# Patient Record
Sex: FEMALE | Race: WHITE | Employment: UNEMPLOYED | URBAN - METROPOLITAN AREA
[De-identification: names, ages, dates, MRNs, and addresses within clinical notes are randomized per-mention and may not be internally consistent; named-entity substitution may affect disease eponyms.]

---

## 2020-04-06 ENCOUNTER — HOSPITAL ENCOUNTER (OUTPATIENT)
Age: 80
Discharge: HOME | End: 2020-04-06
Payer: MEDICARE

## 2020-04-06 ENCOUNTER — HOSPITAL ENCOUNTER (OUTPATIENT)
Dept: CT IMAGING | Age: 80
Discharge: HOME | End: 2020-04-06
Payer: MEDICARE

## 2020-04-06 PROCEDURE — 70450 CT HEAD/BRAIN W/O DYE: CPT

## 2021-12-28 ENCOUNTER — HOSPITAL ENCOUNTER (EMERGENCY)
Age: 81
Discharge: HOME | End: 2021-12-28
Attending: EMERGENCY MEDICINE
Payer: MEDICARE

## 2021-12-28 ENCOUNTER — APPOINTMENT (OUTPATIENT)
Dept: GENERAL RADIOLOGY | Age: 81
End: 2021-12-28
Payer: MEDICARE

## 2021-12-28 VITALS
RESPIRATION RATE: 18 BRPM | TEMPERATURE: 97.7 F | BODY MASS INDEX: 23.44 KG/M2 | WEIGHT: 132.28 LBS | SYSTOLIC BLOOD PRESSURE: 160 MMHG | DIASTOLIC BLOOD PRESSURE: 94 MMHG | OXYGEN SATURATION: 96 % | HEIGHT: 63 IN | HEART RATE: 83 BPM

## 2021-12-28 DIAGNOSIS — M25.561 ACUTE PAIN OF RIGHT KNEE: Primary | ICD-10-CM

## 2021-12-28 PROCEDURE — 6370000000 HC RX 637 (ALT 250 FOR IP): Performed by: PHYSICIAN ASSISTANT

## 2021-12-28 PROCEDURE — 99284 EMERGENCY DEPT VISIT MOD MDM: CPT

## 2021-12-28 PROCEDURE — 73560 X-RAY EXAM OF KNEE 1 OR 2: CPT

## 2021-12-28 RX ORDER — LIDOCAINE 4 G/G
1 PATCH TOPICAL DAILY
Status: DISCONTINUED | OUTPATIENT
Start: 2021-12-28 | End: 2021-12-28 | Stop reason: HOSPADM

## 2021-12-28 RX ORDER — LIDOCAINE 50 MG/G
1 PATCH TOPICAL DAILY
Qty: 30 PATCH | Refills: 0 | Status: SHIPPED | OUTPATIENT
Start: 2021-12-28

## 2021-12-28 RX ORDER — ACETAMINOPHEN 500 MG
1000 TABLET ORAL ONCE
Status: COMPLETED | OUTPATIENT
Start: 2021-12-28 | End: 2021-12-28

## 2021-12-28 RX ADMIN — ACETAMINOPHEN 250 MG: 500 TABLET ORAL at 10:09

## 2021-12-28 ASSESSMENT — PAIN SCALES - GENERAL: PAINLEVEL_OUTOF10: 2

## 2021-12-28 ASSESSMENT — ENCOUNTER SYMPTOMS
ABDOMINAL PAIN: 0
SHORTNESS OF BREATH: 0

## 2021-12-28 NOTE — ED TRIAGE NOTES
R knee pain that started at 02 - 03 this am while pt was up using the bathroom. No pain while resting.  Able to lift at hip, pain while bending at knee, sensation intact

## 2021-12-28 NOTE — ED PROVIDER NOTES
ED Attending Attestation Note     Date of evaluation: 12/28/2021    This patient was seen by the advance practice provider. I have seen and examined the patient, agree with the workup, evaluation, management and diagnosis. The care plan has been discussed. My assessment reveals an 38-year-old female patient presents with right knee pain. She states that she woke up with her leg hanging over the bed. She is mildly tender diffusely around the knee without any obvious deformity. She is otherwise neurovascularly intact. .     Cristion Sr MD  12/28/21 1020

## 2021-12-28 NOTE — FLOWSHEET NOTE
12/28/21 1019   Encounter Summary   Services provided to: Patient and family together   Referral/Consult From: [address] [facility]   (12/28/21, JULIANO.)   Complexity of Encounter Moderate   Length of Encounter 15 minutes   Routine   Type Initial   Assessment Calm; Approachable   Intervention Nurtured hope   Outcome Expressed gratitude

## 2021-12-28 NOTE — ED PROVIDER NOTES
[facility] ENCOUNTER          PHYSICIAN ASSISTANT NOTE       Date of evaluation: 12/28/2021    Chief Complaint     Knee Pain (R knee, twisted at 03 this am )      History of Present Illness     Helga Castorena is a 78 y.o. female who presents to the emergency room with complaints of right knee pain. Patient states that she woke up in her sleep around 2 or 3 in the morning and noticed that both of her knees were bent. She states she stretched and had no pain to either knee. She got up to walk around and noticed some mild pain throughout her right knee. She was still able to ambulate, however was painful. She denies any radiation of pain. Denies numbness, tingling, weakness. Patient denies any recent falls or injury. She states she felt her normal self when going to bed last night. She has never had similar symptoms in the past.  She has not taken any medications in attempt to relieve her pain. She states her pain is a 0/10 at rest, however increases significantly with any ambulation. Review of Systems     Review of Systems   Constitutional: Negative for activity change, chills and fever. HENT: Negative for congestion. Respiratory: Negative for shortness of breath. Cardiovascular: Negative for chest pain. Gastrointestinal: Negative for abdominal pain. Genitourinary: Negative for dysuria. Musculoskeletal:        Right knee pain   Neurological: Negative for dizziness and headaches. Psychiatric/Behavioral: Negative for agitation. Past Medical, Surgical, Family, and Social History     She has a past medical history of Cancer ([facility] [address] Diverticulitis, and Unspecified cerebral artery occlusion with cerebral infarction. She has a past surgical history that includes Inguinal hernia repair (12/30/2011); joint replacement; and skin biopsy. Her family history is not on file. She reports that she has never smoked.  She has never used smokeless tobacco. She reports that she does not drink alcohol and does not use drugs. Medications     Previous Medications    ALENDRONATE (FOSAMAX) 70 MG TABLET    Take 70 mg by mouth every 7 days. ASPIRIN 81 MG EC TABLET    Take 81 mg by mouth daily. CALCIUM-MAGNESIUM-VITAMIN D (CITRACAL CALCIUM+D PO)    Take  by mouth. CLIDINIUM-CHLORDIAZEPOXIDE (LIBRAX) 2.5-5 MG PER CAPSULE    Take 1 capsule by mouth 4 times daily (before meals and nightly). LOVASTATIN (MEVACOR) 40 MG TABLET    Take 40 mg by mouth nightly. TRAZODONE (DESYREL) 50 MG TABLET    Take 75 mg by mouth nightly. Allergies     She has No Known Allergies. Physical Exam     INITIAL VITALS: BP: (!) 171/97, Temp: 97.7 °F (36.5 °C), Pulse: 97, Resp: 18, SpO2: 95 %  Physical Exam  Constitutional:       Appearance: Normal appearance. HENT:      Head: Normocephalic and atraumatic. Nose: Nose normal.   Eyes:      Pupils: Pupils are equal, round, and reactive to light. Cardiovascular:      Rate and Rhythm: Normal rate and regular rhythm. Pulmonary:      Effort: Pulmonary effort is normal.      Breath sounds: Normal breath sounds. Abdominal:      General: Abdomen is flat. Musculoskeletal:      Cervical back: Normal range of motion. Comments: Patient's right knee does appear mildly swollen compared with left. No erythema or warmth to touch. No effusion noted. Patient does have pain when performing passive range of motion. No bony tenderness palpation throughout right knee/tibia, fibula. No tenderness palpation of right calf. No unilateral calf swelling. Skin:     General: Skin is warm and dry. Neurological:      General: No focal deficit present. Mental Status: She is alert and oriented to person, place, and time. Sensory: No sensory deficit. Motor: No weakness.    Psychiatric:         Mood and Affect: Mood normal.         Behavior: Behavior normal.         Diagnostic Results     RADIOLOGY:  XR KNEE RIGHT (1-2 VIEWS) Final Result      2 views demonstrate no fracture or significant arthritis. There is lateral compartment chondrocalcinosis. No effusion. LABS:   No results found for this visit on 12/28/21. RECENT VITALS:  BP: (!) 160/94, Temp: 97.7 °F (36.5 °C), Pulse: 83, Resp: 18, SpO2: 96 %     ED Course     Nursing Notes, Past Medical Hx,Past Surgical Hx, Social Hx, Allergies, and Family Hx were reviewed. The patient was given the following medications:  Orders Placed This Encounter   Medications    acetaminophen (TYLENOL) tablet 1,000 mg    lidocaine 4 % external patch 1 patch    lidocaine (LIDODERM) 5 %     Sig: Place 1 patch onto the skin daily 12 hours on, 12 hours off. Dispense:  30 patch     Refill:  0       CONSULTS:  None    MEDICAL DECISION MAKING / ASSESSMENT / Angelique Titus is a 78 y.o. female who presents the emergency room right knee pain. Vital signs stable on presentation remained stable throughout her stay. Thorough history and physical exam performed in detail above. Patient presents the emergency department right knee pain as detailed in HPI. She has had no trauma or injury. She states when she woke up at 2 or 3 in the morning her knees were flexed. She stretched and had no pain. She got up to walk around and noticed pain to her right knee. She is still able to ambulate, however it is painful. Denies any pain at rest, only with ambulation. On physical exam her right knee does appear mildly swollen compared to the left. No erythema, warmth, or effusion. She does have pain with passive range of motion of right knee. No bony tenderness throughout right knee or proximal tibia/fibula. No soft tissue tenderness to surrounding musculature. No unilateral calf swelling. No ligamentous laxity on my exam.  Patient has 2+ dorsalis pedis pulse bilaterally. Sensation intact in all dermatomes of bilateral lower extremities.   I did order 1 g of Tylenol for her pain, however

## 2023-05-12 ENCOUNTER — APPOINTMENT (OUTPATIENT)
Dept: GENERAL RADIOLOGY | Age: 83
DRG: 481 | End: 2023-05-12
Payer: MEDICARE

## 2023-05-12 ENCOUNTER — APPOINTMENT (OUTPATIENT)
Dept: CT IMAGING | Age: 83
DRG: 481 | End: 2023-05-12
Payer: MEDICARE

## 2023-05-12 ENCOUNTER — ANESTHESIA (OUTPATIENT)
Dept: OPERATING ROOM | Age: 83
End: 2023-05-12
Payer: MEDICARE

## 2023-05-12 ENCOUNTER — HOSPITAL ENCOUNTER (INPATIENT)
Age: 83
LOS: 5 days | Discharge: NURSING, REHAB OR LONG-TERM CARE | DRG: 481 | End: 2023-05-17
Attending: STUDENT IN AN ORGANIZED HEALTH CARE EDUCATION/TRAINING PROGRAM | Admitting: SURGERY
Payer: MEDICARE

## 2023-05-12 ENCOUNTER — ANESTHESIA EVENT (OUTPATIENT)
Dept: OPERATING ROOM | Age: 83
End: 2023-05-12
Payer: MEDICARE

## 2023-05-12 DIAGNOSIS — S72.002A CLOSED DISPLACED FRACTURE OF LEFT FEMORAL NECK (HCC): Primary | ICD-10-CM

## 2023-05-12 DIAGNOSIS — S72.142D CLOSED DISPLACED INTERTROCHANTERIC FRACTURE OF LEFT FEMUR WITH ROUTINE HEALING, SUBSEQUENT ENCOUNTER: ICD-10-CM

## 2023-05-12 DIAGNOSIS — I48.91 ATRIAL FIBRILLATION WITH RVR (HCC): ICD-10-CM

## 2023-05-12 PROBLEM — S72.142A CLOSED DISPLACED INTERTROCHANTERIC FRACTURE OF LEFT FEMUR (HCC): Status: ACTIVE | Noted: 2023-05-12

## 2023-05-12 LAB
ABO + RH BLD: NORMAL
ANION GAP SERPL CALCULATED.3IONS-SCNC: 13 MMOL/L (ref 3–16)
ANTI-XA UNFRAC HEPARIN: >1.1 IU/ML (ref 0.3–0.7)
BASOPHILS # BLD: 0.1 K/UL (ref 0–0.2)
BASOPHILS NFR BLD: 0.6 %
BLD GP AB SCN SERPL QL: NORMAL
BUN SERPL-MCNC: 11 MG/DL (ref 7–20)
CALCIUM SERPL-MCNC: 9.5 MG/DL (ref 8.3–10.6)
CHLORIDE SERPL-SCNC: 105 MMOL/L (ref 99–110)
CO2 SERPL-SCNC: 26 MMOL/L (ref 21–32)
CREAT SERPL-MCNC: 1 MG/DL (ref 0.6–1.2)
DEPRECATED RDW RBC AUTO: 15.2 % (ref 12.4–15.4)
EKG ATRIAL RATE: 113 BPM
EKG DIAGNOSIS: NORMAL
EKG Q-T INTERVAL: 394 MS
EKG QRS DURATION: 124 MS
EKG QTC CALCULATION (BAZETT): 540 MS
EKG R AXIS: 18 DEGREES
EKG T AXIS: -12 DEGREES
EKG VENTRICULAR RATE: 113 BPM
EOSINOPHIL # BLD: 0.1 K/UL (ref 0–0.6)
EOSINOPHIL NFR BLD: 0.4 %
GFR SERPLBLD CREATININE-BSD FMLA CKD-EPI: 55 ML/MIN/{1.73_M2}
GLUCOSE SERPL-MCNC: 205 MG/DL (ref 70–99)
HCT VFR BLD AUTO: 44.3 % (ref 36–48)
HGB BLD-MCNC: 14.4 G/DL (ref 12–16)
INR PPP: 1.17 (ref 0.84–1.16)
LYMPHOCYTES # BLD: 0.9 K/UL (ref 1–5.1)
LYMPHOCYTES NFR BLD: 4 %
MCH RBC QN AUTO: 28.1 PG (ref 26–34)
MCHC RBC AUTO-ENTMCNC: 32.4 G/DL (ref 31–36)
MCV RBC AUTO: 86.7 FL (ref 80–100)
MONOCYTES # BLD: 1.5 K/UL (ref 0–1.3)
MONOCYTES NFR BLD: 6.9 %
NEUTROPHILS # BLD: 19.1 K/UL (ref 1.7–7.7)
NEUTROPHILS NFR BLD: 88.1 %
PLATELET # BLD AUTO: 691 K/UL (ref 135–450)
PMV BLD AUTO: 7 FL (ref 5–10.5)
POTASSIUM SERPL-SCNC: 3.6 MMOL/L (ref 3.5–5.1)
PROTHROMBIN TIME: 15 SEC (ref 11.5–14.8)
RBC # BLD AUTO: 5.11 M/UL (ref 4–5.2)
SODIUM SERPL-SCNC: 144 MMOL/L (ref 136–145)
TROPONIN, HIGH SENSITIVITY: 16 NG/L (ref 0–14)
TROPONIN, HIGH SENSITIVITY: 19 NG/L (ref 0–14)
WBC # BLD AUTO: 21.7 K/UL (ref 4–11)

## 2023-05-12 PROCEDURE — 3700000001 HC ADD 15 MINUTES (ANESTHESIA): Performed by: ORTHOPAEDIC SURGERY

## 2023-05-12 PROCEDURE — 73522 X-RAY EXAM HIPS BI 3-4 VIEWS: CPT

## 2023-05-12 PROCEDURE — 3600000004 HC SURGERY LEVEL 4 BASE: Performed by: ORTHOPAEDIC SURGERY

## 2023-05-12 PROCEDURE — 84484 ASSAY OF TROPONIN QUANT: CPT

## 2023-05-12 PROCEDURE — 1200000000 HC SEMI PRIVATE

## 2023-05-12 PROCEDURE — 72125 CT NECK SPINE W/O DYE: CPT

## 2023-05-12 PROCEDURE — 3700000000 HC ANESTHESIA ATTENDED CARE: Performed by: ORTHOPAEDIC SURGERY

## 2023-05-12 PROCEDURE — 80048 BASIC METABOLIC PNL TOTAL CA: CPT

## 2023-05-12 PROCEDURE — 2500000003 HC RX 250 WO HCPCS: Performed by: NURSE ANESTHETIST, CERTIFIED REGISTERED

## 2023-05-12 PROCEDURE — 6370000000 HC RX 637 (ALT 250 FOR IP): Performed by: NURSE PRACTITIONER

## 2023-05-12 PROCEDURE — 2720000010 HC SURG SUPPLY STERILE: Performed by: ORTHOPAEDIC SURGERY

## 2023-05-12 PROCEDURE — 85025 COMPLETE CBC W/AUTO DIFF WBC: CPT

## 2023-05-12 PROCEDURE — 36415 COLL VENOUS BLD VENIPUNCTURE: CPT

## 2023-05-12 PROCEDURE — 3209999900 FLUORO FOR SURGICAL PROCEDURES

## 2023-05-12 PROCEDURE — 7100000000 HC PACU RECOVERY - FIRST 15 MIN: Performed by: ORTHOPAEDIC SURGERY

## 2023-05-12 PROCEDURE — 93005 ELECTROCARDIOGRAM TRACING: CPT | Performed by: STUDENT IN AN ORGANIZED HEALTH CARE EDUCATION/TRAINING PROGRAM

## 2023-05-12 PROCEDURE — 85610 PROTHROMBIN TIME: CPT

## 2023-05-12 PROCEDURE — 70450 CT HEAD/BRAIN W/O DYE: CPT

## 2023-05-12 PROCEDURE — 6360000002 HC RX W HCPCS: Performed by: NURSE ANESTHETIST, CERTIFIED REGISTERED

## 2023-05-12 PROCEDURE — 99285 EMERGENCY DEPT VISIT HI MDM: CPT

## 2023-05-12 PROCEDURE — 86900 BLOOD TYPING SEROLOGIC ABO: CPT

## 2023-05-12 PROCEDURE — 0QS706Z REPOSITION LEFT UPPER FEMUR WITH INTRAMEDULLARY INTERNAL FIXATION DEVICE, OPEN APPROACH: ICD-10-PCS | Performed by: ORTHOPAEDIC SURGERY

## 2023-05-12 PROCEDURE — 2580000003 HC RX 258: Performed by: STUDENT IN AN ORGANIZED HEALTH CARE EDUCATION/TRAINING PROGRAM

## 2023-05-12 PROCEDURE — 86901 BLOOD TYPING SEROLOGIC RH(D): CPT

## 2023-05-12 PROCEDURE — 96374 THER/PROPH/DIAG INJ IV PUSH: CPT

## 2023-05-12 PROCEDURE — 99024 POSTOP FOLLOW-UP VISIT: CPT | Performed by: PHYSICIAN ASSISTANT

## 2023-05-12 PROCEDURE — 86850 RBC ANTIBODY SCREEN: CPT

## 2023-05-12 PROCEDURE — C1769 GUIDE WIRE: HCPCS | Performed by: ORTHOPAEDIC SURGERY

## 2023-05-12 PROCEDURE — 6360000002 HC RX W HCPCS: Performed by: STUDENT IN AN ORGANIZED HEALTH CARE EDUCATION/TRAINING PROGRAM

## 2023-05-12 PROCEDURE — 7100000001 HC PACU RECOVERY - ADDTL 15 MIN: Performed by: ORTHOPAEDIC SURGERY

## 2023-05-12 PROCEDURE — 27245 TREAT THIGH FRACTURE: CPT | Performed by: ORTHOPAEDIC SURGERY

## 2023-05-12 PROCEDURE — 2580000003 HC RX 258: Performed by: NURSE ANESTHETIST, CERTIFIED REGISTERED

## 2023-05-12 PROCEDURE — 99223 1ST HOSP IP/OBS HIGH 75: CPT | Performed by: ORTHOPAEDIC SURGERY

## 2023-05-12 PROCEDURE — 85520 HEPARIN ASSAY: CPT

## 2023-05-12 PROCEDURE — 96361 HYDRATE IV INFUSION ADD-ON: CPT

## 2023-05-12 PROCEDURE — 71045 X-RAY EXAM CHEST 1 VIEW: CPT

## 2023-05-12 PROCEDURE — C1713 ANCHOR/SCREW BN/BN,TIS/BN: HCPCS | Performed by: ORTHOPAEDIC SURGERY

## 2023-05-12 PROCEDURE — 2709999900 HC NON-CHARGEABLE SUPPLY: Performed by: ORTHOPAEDIC SURGERY

## 2023-05-12 PROCEDURE — 73552 X-RAY EXAM OF FEMUR 2/>: CPT

## 2023-05-12 PROCEDURE — 2580000003 HC RX 258: Performed by: SURGERY

## 2023-05-12 PROCEDURE — 2580000003 HC RX 258: Performed by: ORTHOPAEDIC SURGERY

## 2023-05-12 PROCEDURE — 3600000014 HC SURGERY LEVEL 4 ADDTL 15MIN: Performed by: ORTHOPAEDIC SURGERY

## 2023-05-12 DEVICE — LAG SCREW, TI
Type: IMPLANTABLE DEVICE | Site: HIP | Status: FUNCTIONAL
Brand: GAMMA

## 2023-05-12 DEVICE — K-WIRE
Type: IMPLANTABLE DEVICE | Site: HIP | Status: FUNCTIONAL
Brand: GAMMA

## 2023-05-12 DEVICE — LOCKING SCREW
Type: IMPLANTABLE DEVICE | Site: HIP | Status: FUNCTIONAL
Brand: T2 ALPHA

## 2023-05-12 DEVICE — LONG NAIL KIT R1.5, TI, LEFT
Type: IMPLANTABLE DEVICE | Site: HIP | Status: FUNCTIONAL
Brand: GAMMA

## 2023-05-12 RX ORDER — ONDANSETRON 2 MG/ML
4 INJECTION INTRAMUSCULAR; INTRAVENOUS
Status: DISCONTINUED | OUTPATIENT
Start: 2023-05-12 | End: 2023-05-12 | Stop reason: HOSPADM

## 2023-05-12 RX ORDER — SODIUM CHLORIDE 450 MG/100ML
INJECTION, SOLUTION INTRAVENOUS CONTINUOUS
Status: DISCONTINUED | OUTPATIENT
Start: 2023-05-12 | End: 2023-05-14

## 2023-05-12 RX ORDER — SODIUM CHLORIDE 0.9 % (FLUSH) 0.9 %
5-40 SYRINGE (ML) INJECTION EVERY 12 HOURS SCHEDULED
Status: DISCONTINUED | OUTPATIENT
Start: 2023-05-12 | End: 2023-05-12 | Stop reason: HOSPADM

## 2023-05-12 RX ORDER — CEFAZOLIN SODIUM 1 G/3ML
INJECTION, POWDER, FOR SOLUTION INTRAMUSCULAR; INTRAVENOUS PRN
Status: DISCONTINUED | OUTPATIENT
Start: 2023-05-12 | End: 2023-05-12 | Stop reason: SDUPTHER

## 2023-05-12 RX ORDER — ACETAMINOPHEN 325 MG/1
650 TABLET ORAL EVERY 6 HOURS PRN
Status: DISCONTINUED | OUTPATIENT
Start: 2023-05-12 | End: 2023-05-17 | Stop reason: HOSPADM

## 2023-05-12 RX ORDER — ETOMIDATE 2 MG/ML
INJECTION INTRAVENOUS PRN
Status: DISCONTINUED | OUTPATIENT
Start: 2023-05-12 | End: 2023-05-12 | Stop reason: SDUPTHER

## 2023-05-12 RX ORDER — SODIUM CHLORIDE 9 MG/ML
INJECTION, SOLUTION INTRAVENOUS PRN
Status: DISCONTINUED | OUTPATIENT
Start: 2023-05-12 | End: 2023-05-12 | Stop reason: HOSPADM

## 2023-05-12 RX ORDER — FENTANYL CITRATE 50 UG/ML
INJECTION, SOLUTION INTRAMUSCULAR; INTRAVENOUS PRN
Status: DISCONTINUED | OUTPATIENT
Start: 2023-05-12 | End: 2023-05-12 | Stop reason: SDUPTHER

## 2023-05-12 RX ORDER — POLYETHYLENE GLYCOL 3350 17 G/17G
17 POWDER, FOR SOLUTION ORAL DAILY PRN
Status: DISCONTINUED | OUTPATIENT
Start: 2023-05-12 | End: 2023-05-17 | Stop reason: HOSPADM

## 2023-05-12 RX ORDER — ONDANSETRON 2 MG/ML
INJECTION INTRAMUSCULAR; INTRAVENOUS PRN
Status: DISCONTINUED | OUTPATIENT
Start: 2023-05-12 | End: 2023-05-12 | Stop reason: SDUPTHER

## 2023-05-12 RX ORDER — LIDOCAINE HYDROCHLORIDE 20 MG/ML
INJECTION, SOLUTION EPIDURAL; INFILTRATION; INTRACAUDAL; PERINEURAL PRN
Status: DISCONTINUED | OUTPATIENT
Start: 2023-05-12 | End: 2023-05-12 | Stop reason: SDUPTHER

## 2023-05-12 RX ORDER — ATORVASTATIN CALCIUM 20 MG/1
10 TABLET, FILM COATED ORAL DAILY
Status: DISCONTINUED | OUTPATIENT
Start: 2023-05-12 | End: 2023-05-13

## 2023-05-12 RX ORDER — HYDROCODONE BITARTRATE AND ACETAMINOPHEN 5; 325 MG/1; MG/1
1 TABLET ORAL EVERY 6 HOURS PRN
Status: DISCONTINUED | OUTPATIENT
Start: 2023-05-12 | End: 2023-05-17 | Stop reason: HOSPADM

## 2023-05-12 RX ORDER — SUCCINYLCHOLINE CHLORIDE 20 MG/ML
INJECTION INTRAMUSCULAR; INTRAVENOUS PRN
Status: DISCONTINUED | OUTPATIENT
Start: 2023-05-12 | End: 2023-05-12 | Stop reason: SDUPTHER

## 2023-05-12 RX ORDER — ACETAMINOPHEN 650 MG/1
650 SUPPOSITORY RECTAL EVERY 6 HOURS PRN
Status: DISCONTINUED | OUTPATIENT
Start: 2023-05-12 | End: 2023-05-17 | Stop reason: HOSPADM

## 2023-05-12 RX ORDER — MORPHINE SULFATE 2 MG/ML
2 INJECTION, SOLUTION INTRAMUSCULAR; INTRAVENOUS
Status: DISCONTINUED | OUTPATIENT
Start: 2023-05-12 | End: 2023-05-16

## 2023-05-12 RX ORDER — SODIUM CHLORIDE 9 MG/ML
INJECTION, SOLUTION INTRAVENOUS PRN
Status: DISCONTINUED | OUTPATIENT
Start: 2023-05-12 | End: 2023-05-17 | Stop reason: HOSPADM

## 2023-05-12 RX ORDER — CITALOPRAM 10 MG/1
10 TABLET ORAL DAILY
COMMUNITY
Start: 2023-03-07

## 2023-05-12 RX ORDER — ROCURONIUM BROMIDE 10 MG/ML
INJECTION, SOLUTION INTRAVENOUS PRN
Status: DISCONTINUED | OUTPATIENT
Start: 2023-05-12 | End: 2023-05-12 | Stop reason: SDUPTHER

## 2023-05-12 RX ORDER — SODIUM CHLORIDE 0.9 % (FLUSH) 0.9 %
5-40 SYRINGE (ML) INJECTION EVERY 12 HOURS SCHEDULED
Status: DISCONTINUED | OUTPATIENT
Start: 2023-05-12 | End: 2023-05-17 | Stop reason: HOSPADM

## 2023-05-12 RX ORDER — IPRATROPIUM BROMIDE AND ALBUTEROL SULFATE 2.5; .5 MG/3ML; MG/3ML
1 SOLUTION RESPIRATORY (INHALATION)
Status: DISCONTINUED | OUTPATIENT
Start: 2023-05-12 | End: 2023-05-12 | Stop reason: HOSPADM

## 2023-05-12 RX ORDER — SODIUM CHLORIDE, SODIUM LACTATE, POTASSIUM CHLORIDE, AND CALCIUM CHLORIDE .6; .31; .03; .02 G/100ML; G/100ML; G/100ML; G/100ML
500 INJECTION, SOLUTION INTRAVENOUS ONCE
Status: COMPLETED | OUTPATIENT
Start: 2023-05-12 | End: 2023-05-12

## 2023-05-12 RX ORDER — SODIUM CHLORIDE 0.9 % (FLUSH) 0.9 %
5-40 SYRINGE (ML) INJECTION PRN
Status: DISCONTINUED | OUTPATIENT
Start: 2023-05-12 | End: 2023-05-12 | Stop reason: HOSPADM

## 2023-05-12 RX ORDER — ACETAMINOPHEN 325 MG/1
650 TABLET ORAL
Status: DISCONTINUED | OUTPATIENT
Start: 2023-05-12 | End: 2023-05-12 | Stop reason: HOSPADM

## 2023-05-12 RX ORDER — ONDANSETRON 4 MG/1
4 TABLET, ORALLY DISINTEGRATING ORAL EVERY 8 HOURS PRN
Status: DISCONTINUED | OUTPATIENT
Start: 2023-05-12 | End: 2023-05-17 | Stop reason: HOSPADM

## 2023-05-12 RX ORDER — SODIUM CHLORIDE 9 MG/ML
INJECTION, SOLUTION INTRAVENOUS CONTINUOUS
Status: ACTIVE | OUTPATIENT
Start: 2023-05-12 | End: 2023-05-13

## 2023-05-12 RX ORDER — SODIUM CHLORIDE 0.9 % (FLUSH) 0.9 %
5-40 SYRINGE (ML) INJECTION PRN
Status: DISCONTINUED | OUTPATIENT
Start: 2023-05-12 | End: 2023-05-17 | Stop reason: HOSPADM

## 2023-05-12 RX ORDER — ONDANSETRON 2 MG/ML
4 INJECTION INTRAMUSCULAR; INTRAVENOUS EVERY 6 HOURS PRN
Status: DISCONTINUED | OUTPATIENT
Start: 2023-05-12 | End: 2023-05-17 | Stop reason: HOSPADM

## 2023-05-12 RX ORDER — PROCHLORPERAZINE EDISYLATE 5 MG/ML
5 INJECTION INTRAMUSCULAR; INTRAVENOUS
Status: DISCONTINUED | OUTPATIENT
Start: 2023-05-12 | End: 2023-05-12 | Stop reason: HOSPADM

## 2023-05-12 RX ORDER — CITALOPRAM 10 MG/1
10 TABLET ORAL DAILY
Status: DISCONTINUED | OUTPATIENT
Start: 2023-05-12 | End: 2023-05-17 | Stop reason: HOSPADM

## 2023-05-12 RX ORDER — APIXABAN 2.5 MG/1
2.5 TABLET, FILM COATED ORAL 2 TIMES DAILY
COMMUNITY
Start: 2023-03-30

## 2023-05-12 RX ORDER — CHLORDIAZEPOXIDE HYDROCHLORIDE AND CLIDINIUM BROMIDE 5; 2.5 MG/1; MG/1
1 CAPSULE ORAL
Status: DISCONTINUED | OUTPATIENT
Start: 2023-05-12 | End: 2023-05-13 | Stop reason: ALTCHOICE

## 2023-05-12 RX ORDER — FENTANYL CITRATE 50 UG/ML
25 INJECTION, SOLUTION INTRAMUSCULAR; INTRAVENOUS EVERY 5 MIN PRN
Status: DISCONTINUED | OUTPATIENT
Start: 2023-05-12 | End: 2023-05-12 | Stop reason: HOSPADM

## 2023-05-12 RX ORDER — SODIUM CHLORIDE, SODIUM LACTATE, POTASSIUM CHLORIDE, CALCIUM CHLORIDE 600; 310; 30; 20 MG/100ML; MG/100ML; MG/100ML; MG/100ML
INJECTION, SOLUTION INTRAVENOUS CONTINUOUS PRN
Status: DISCONTINUED | OUTPATIENT
Start: 2023-05-12 | End: 2023-05-12 | Stop reason: SDUPTHER

## 2023-05-12 RX ORDER — LABETALOL HYDROCHLORIDE 5 MG/ML
10 INJECTION, SOLUTION INTRAVENOUS
Status: DISCONTINUED | OUTPATIENT
Start: 2023-05-12 | End: 2023-05-12 | Stop reason: HOSPADM

## 2023-05-12 RX ORDER — MORPHINE SULFATE 2 MG/ML
4 INJECTION, SOLUTION INTRAMUSCULAR; INTRAVENOUS
Status: DISCONTINUED | OUTPATIENT
Start: 2023-05-12 | End: 2023-05-16

## 2023-05-12 RX ADMIN — SODIUM CHLORIDE, SODIUM LACTATE, POTASSIUM CHLORIDE, AND CALCIUM CHLORIDE: .6; .31; .03; .02 INJECTION, SOLUTION INTRAVENOUS at 16:42

## 2023-05-12 RX ADMIN — SUGAMMADEX 200 MG: 100 INJECTION, SOLUTION INTRAVENOUS at 17:26

## 2023-05-12 RX ADMIN — CEFAZOLIN SODIUM 2 G: 1 POWDER, FOR SOLUTION INTRAMUSCULAR; INTRAVENOUS at 16:27

## 2023-05-12 RX ADMIN — ROCURONIUM BROMIDE 30 MG: 10 INJECTION INTRAVENOUS at 16:41

## 2023-05-12 RX ADMIN — LIDOCAINE HYDROCHLORIDE 100 MG: 20 INJECTION, SOLUTION EPIDURAL; INFILTRATION; INTRACAUDAL; PERINEURAL at 16:30

## 2023-05-12 RX ADMIN — ETOMIDATE 10 MG: 20 INJECTION, SOLUTION INTRAVENOUS at 16:32

## 2023-05-12 RX ADMIN — HYDROMORPHONE HYDROCHLORIDE 0.25 MG: 1 INJECTION, SOLUTION INTRAMUSCULAR; INTRAVENOUS; SUBCUTANEOUS at 09:56

## 2023-05-12 RX ADMIN — SODIUM CHLORIDE: 4.5 INJECTION, SOLUTION INTRAVENOUS at 21:50

## 2023-05-12 RX ADMIN — SODIUM CHLORIDE, SODIUM LACTATE, POTASSIUM CHLORIDE, AND CALCIUM CHLORIDE: .6; .31; .03; .02 INJECTION, SOLUTION INTRAVENOUS at 16:25

## 2023-05-12 RX ADMIN — PHENYLEPHRINE HYDROCHLORIDE 100 MCG: 10 INJECTION, SOLUTION INTRAMUSCULAR; INTRAVENOUS; SUBCUTANEOUS at 16:32

## 2023-05-12 RX ADMIN — PHENYLEPHRINE HYDROCHLORIDE 200 MCG: 10 INJECTION, SOLUTION INTRAMUSCULAR; INTRAVENOUS; SUBCUTANEOUS at 16:47

## 2023-05-12 RX ADMIN — SODIUM CHLORIDE: 9 INJECTION, SOLUTION INTRAVENOUS at 18:41

## 2023-05-12 RX ADMIN — FENTANYL CITRATE 100 MCG: 50 INJECTION, SOLUTION INTRAMUSCULAR; INTRAVENOUS at 16:30

## 2023-05-12 RX ADMIN — SODIUM CHLORIDE, POTASSIUM CHLORIDE, SODIUM LACTATE AND CALCIUM CHLORIDE 500 ML: 600; 310; 30; 20 INJECTION, SOLUTION INTRAVENOUS at 11:03

## 2023-05-12 RX ADMIN — ONDANSETRON 4 MG: 2 INJECTION INTRAMUSCULAR; INTRAVENOUS at 16:41

## 2023-05-12 RX ADMIN — HYDROCODONE BITARTRATE AND ACETAMINOPHEN 1 TABLET: 5; 325 TABLET ORAL at 21:47

## 2023-05-12 RX ADMIN — SUCCINYLCHOLINE CHLORIDE 80 MG: 20 INJECTION, SOLUTION INTRAMUSCULAR; INTRAVENOUS; PARENTERAL at 16:33

## 2023-05-12 ASSESSMENT — PAIN SCALES - GENERAL
PAINLEVEL_OUTOF10: 0
PAINLEVEL_OUTOF10: 0
PAINLEVEL_OUTOF10: 10
PAINLEVEL_OUTOF10: 9
PAINLEVEL_OUTOF10: 8

## 2023-05-12 ASSESSMENT — PAIN DESCRIPTION - ORIENTATION
ORIENTATION: LEFT
ORIENTATION: LEFT

## 2023-05-12 ASSESSMENT — PAIN - FUNCTIONAL ASSESSMENT
PAIN_FUNCTIONAL_ASSESSMENT: PREVENTS OR INTERFERES WITH ALL ACTIVE AND SOME PASSIVE ACTIVITIES
PAIN_FUNCTIONAL_ASSESSMENT: 0-10

## 2023-05-12 ASSESSMENT — ENCOUNTER SYMPTOMS
RHINORRHEA: 0
DIARRHEA: 0
NAUSEA: 0
SHORTNESS OF BREATH: 0
ABDOMINAL PAIN: 0
VOMITING: 0
BACK PAIN: 0
COUGH: 0

## 2023-05-12 ASSESSMENT — PAIN DESCRIPTION - DESCRIPTORS
DESCRIPTORS: ACHING
DESCRIPTORS: ACHING

## 2023-05-12 ASSESSMENT — PAIN DESCRIPTION - LOCATION
LOCATION: LEG
LOCATION: LEG

## 2023-05-12 ASSESSMENT — PAIN DESCRIPTION - PAIN TYPE: TYPE: ACUTE PAIN

## 2023-05-12 ASSESSMENT — PAIN DESCRIPTION - FREQUENCY: FREQUENCY: CONTINUOUS

## 2023-05-12 NOTE — ANESTHESIA POSTPROCEDURE EVALUATION
Department of Anesthesiology  Postprocedure Note    Patient: Logan Coats  MRN: [id number]  YOB: 1940  Date of evaluation: 5/12/2023      Procedure Summary     Date: 05/12/23 Room / Location: [address]    Anesthesia Start: 1625 Anesthesia Stop: 8323    Procedure: LEFT HIP GAMMA NAILING (Left: Hip) Diagnosis:       Type I or II open fracture of left hip, initial encounter ([facility]      (LEFT HIP FRACTURE)    Surgeons: Alisha Florian MD Responsible Provider: Jacqui Worrell MD    Anesthesia Type: general ASA Status: 3          Anesthesia Type: No value filed.     Ivan Phase I: Ivan Score: 9    Ivan Phase II:        Anesthesia Post Evaluation    Patient location during evaluation: PACU  Patient participation: complete - patient participated  Level of consciousness: awake  Pain score: 0  Nausea & Vomiting: no nausea and no vomiting  Complications: no  Cardiovascular status: blood pressure returned to baseline  Respiratory status: acceptable  Hydration status: euvolemic

## 2023-05-12 NOTE — ANESTHESIA PRE PROCEDURE
Department of Anesthesiology  Preprocedure Note       Name:  Radha Mayer   Age:  78 y.o.  :  1940                                          MRN:  [id number]         Date:  2023      Surgeon: Warden Black):  Sebastian Vazquez MD    Procedure: Procedure(s):  LEFT HIP GAMMA NAILING    Medications prior to admission:   Prior to Admission medications    Medication Sig Start Date End Date Taking? Authorizing Provider   ELIQUIS 2.5 MG TABS tablet Take 1 tablet by mouth 2 times daily 3/30/23   Historical Provider, MD   citalopram (CELEXA) 10 MG tablet TAKE 1 TABLET BY MOUTH EVERY DAY STRENGTH: 10 MG 3/7/23   Historical Provider, MD   clidinium-chlordiazepoxide (LIBRAX) 2.5-5 MG per capsule Take 1 capsule by mouth 4 times daily (before meals and nightly). Historical Provider, MD   lovastatin (MEVACOR) 40 MG tablet Take 40 mg by mouth nightly. Historical Provider, MD   Calcium-Magnesium-Vitamin D (CITRACAL CALCIUM+D PO) Take  by mouth. Historical Provider, MD       Current medications:    No current facility-administered medications for this encounter. Current Outpatient Medications   Medication Sig Dispense Refill    ELIQUIS 2.5 MG TABS tablet Take 1 tablet by mouth 2 times daily      citalopram (CELEXA) 10 MG tablet TAKE 1 TABLET BY MOUTH EVERY DAY STRENGTH: 10 MG      clidinium-chlordiazepoxide (LIBRAX) 2.5-5 MG per capsule Take 1 capsule by mouth 4 times daily (before meals and nightly).  lovastatin (MEVACOR) 40 MG tablet Take 40 mg by mouth nightly.  Calcium-Magnesium-Vitamin D (CITRACAL CALCIUM+D PO) Take  by mouth.            Allergies:  No Known Allergies    Problem List:    Patient Active Problem List   Diagnosis Code    Incarcerated femoral hernia K41.30       Past Medical History:        Diagnosis Date    Cancer ([facility] [address]     Diverticulitis     Unspecified cerebral artery occlusion with cerebral infarction        Past Surgical History:        Procedure Laterality Date   

## 2023-05-13 LAB
HCT VFR BLD AUTO: 34.3 % (ref 36–48)
HGB BLD-MCNC: 11.3 G/DL (ref 12–16)

## 2023-05-13 PROCEDURE — 1200000000 HC SEMI PRIVATE

## 2023-05-13 PROCEDURE — 85014 HEMATOCRIT: CPT

## 2023-05-13 PROCEDURE — 97535 SELF CARE MNGMENT TRAINING: CPT

## 2023-05-13 PROCEDURE — 36415 COLL VENOUS BLD VENIPUNCTURE: CPT

## 2023-05-13 PROCEDURE — 97116 GAIT TRAINING THERAPY: CPT

## 2023-05-13 PROCEDURE — 2580000003 HC RX 258: Performed by: ORTHOPAEDIC SURGERY

## 2023-05-13 PROCEDURE — 85018 HEMOGLOBIN: CPT

## 2023-05-13 PROCEDURE — 94150 VITAL CAPACITY TEST: CPT

## 2023-05-13 PROCEDURE — 97166 OT EVAL MOD COMPLEX 45 MIN: CPT

## 2023-05-13 PROCEDURE — 97530 THERAPEUTIC ACTIVITIES: CPT

## 2023-05-13 PROCEDURE — 6370000000 HC RX 637 (ALT 250 FOR IP): Performed by: NURSE PRACTITIONER

## 2023-05-13 PROCEDURE — 97162 PT EVAL MOD COMPLEX 30 MIN: CPT

## 2023-05-13 PROCEDURE — 6360000002 HC RX W HCPCS: Performed by: ORTHOPAEDIC SURGERY

## 2023-05-13 PROCEDURE — 6370000000 HC RX 637 (ALT 250 FOR IP): Performed by: ORTHOPAEDIC SURGERY

## 2023-05-13 RX ORDER — ATORVASTATIN CALCIUM 80 MG/1
80 TABLET, FILM COATED ORAL DAILY
Status: DISCONTINUED | OUTPATIENT
Start: 2023-05-14 | End: 2023-05-17 | Stop reason: HOSPADM

## 2023-05-13 RX ADMIN — SODIUM CHLORIDE, PRESERVATIVE FREE 10 ML: 5 INJECTION INTRAVENOUS at 08:08

## 2023-05-13 RX ADMIN — CITALOPRAM HYDROBROMIDE 10 MG: 10 TABLET ORAL at 08:07

## 2023-05-13 RX ADMIN — SODIUM CHLORIDE: 4.5 INJECTION, SOLUTION INTRAVENOUS at 13:06

## 2023-05-13 RX ADMIN — HYDROCODONE BITARTRATE AND ACETAMINOPHEN 1 TABLET: 5; 325 TABLET ORAL at 08:07

## 2023-05-13 RX ADMIN — CEFAZOLIN 2000 MG: 2 INJECTION, POWDER, FOR SOLUTION INTRAMUSCULAR; INTRAVENOUS at 08:06

## 2023-05-13 RX ADMIN — HYDROCODONE BITARTRATE AND ACETAMINOPHEN 1 TABLET: 5; 325 TABLET ORAL at 18:54

## 2023-05-13 RX ADMIN — CEFAZOLIN 2000 MG: 2 INJECTION, POWDER, FOR SOLUTION INTRAMUSCULAR; INTRAVENOUS at 00:52

## 2023-05-13 ASSESSMENT — PAIN DESCRIPTION - PAIN TYPE: TYPE: SURGICAL PAIN

## 2023-05-13 ASSESSMENT — PAIN DESCRIPTION - DESCRIPTORS
DESCRIPTORS: ACHING
DESCRIPTORS: SORE;ACHING
DESCRIPTORS: ACHING

## 2023-05-13 ASSESSMENT — PAIN DESCRIPTION - ORIENTATION
ORIENTATION: LEFT

## 2023-05-13 ASSESSMENT — PAIN DESCRIPTION - LOCATION
LOCATION: HIP
LOCATION: LEG
LOCATION: HIP
LOCATION: HIP

## 2023-05-13 ASSESSMENT — PAIN SCALES - GENERAL
PAINLEVEL_OUTOF10: 4
PAINLEVEL_OUTOF10: 5
PAINLEVEL_OUTOF10: 4
PAINLEVEL_OUTOF10: 6
PAINLEVEL_OUTOF10: 8
PAINLEVEL_OUTOF10: 4

## 2023-05-13 ASSESSMENT — PAIN - FUNCTIONAL ASSESSMENT: PAIN_FUNCTIONAL_ASSESSMENT: ACTIVITIES ARE NOT PREVENTED

## 2023-05-13 ASSESSMENT — PAIN DESCRIPTION - FREQUENCY: FREQUENCY: INTERMITTENT

## 2023-05-14 LAB
ALBUMIN SERPL-MCNC: 2.7 G/DL (ref 3.4–5)
ALBUMIN/GLOB SERPL: 1.1 {RATIO} (ref 1.1–2.2)
ALP SERPL-CCNC: 53 U/L (ref 40–129)
ALT SERPL-CCNC: <5 U/L (ref 10–40)
ANION GAP SERPL CALCULATED.3IONS-SCNC: 5 MMOL/L (ref 3–16)
AST SERPL-CCNC: 16 U/L (ref 15–37)
BASOPHILS # BLD: 0.1 K/UL (ref 0–0.2)
BASOPHILS NFR BLD: 0.8 %
BILIRUB SERPL-MCNC: 0.8 MG/DL (ref 0–1)
BUN SERPL-MCNC: 17 MG/DL (ref 7–20)
CALCIUM SERPL-MCNC: 8 MG/DL (ref 8.3–10.6)
CHLORIDE SERPL-SCNC: 104 MMOL/L (ref 99–110)
CO2 SERPL-SCNC: 28 MMOL/L (ref 21–32)
CREAT SERPL-MCNC: 0.8 MG/DL (ref 0.6–1.2)
DEPRECATED RDW RBC AUTO: 14.9 % (ref 12.4–15.4)
EOSINOPHIL # BLD: 0.2 K/UL (ref 0–0.6)
EOSINOPHIL NFR BLD: 2.4 %
GFR SERPLBLD CREATININE-BSD FMLA CKD-EPI: >60 ML/MIN/{1.73_M2}
GLUCOSE SERPL-MCNC: 128 MG/DL (ref 70–99)
HCT VFR BLD AUTO: 25.7 % (ref 36–48)
HGB BLD-MCNC: 8.4 G/DL (ref 12–16)
LYMPHOCYTES # BLD: 1.7 K/UL (ref 1–5.1)
LYMPHOCYTES NFR BLD: 19.6 %
MCH RBC QN AUTO: 28.9 PG (ref 26–34)
MCHC RBC AUTO-ENTMCNC: 32.8 G/DL (ref 31–36)
MCV RBC AUTO: 88.1 FL (ref 80–100)
MONOCYTES # BLD: 1.5 K/UL (ref 0–1.3)
MONOCYTES NFR BLD: 16.9 %
NEUTROPHILS # BLD: 5.2 K/UL (ref 1.7–7.7)
NEUTROPHILS NFR BLD: 60.3 %
PLATELET # BLD AUTO: 458 K/UL (ref 135–450)
PMV BLD AUTO: 6.7 FL (ref 5–10.5)
POTASSIUM SERPL-SCNC: 4.5 MMOL/L (ref 3.5–5.1)
PROT SERPL-MCNC: 5.1 G/DL (ref 6.4–8.2)
RBC # BLD AUTO: 2.92 M/UL (ref 4–5.2)
SODIUM SERPL-SCNC: 137 MMOL/L (ref 136–145)
WBC # BLD AUTO: 8.7 K/UL (ref 4–11)

## 2023-05-14 PROCEDURE — 85025 COMPLETE CBC W/AUTO DIFF WBC: CPT

## 2023-05-14 PROCEDURE — 6370000000 HC RX 637 (ALT 250 FOR IP): Performed by: INTERNAL MEDICINE

## 2023-05-14 PROCEDURE — 6360000002 HC RX W HCPCS: Performed by: ORTHOPAEDIC SURGERY

## 2023-05-14 PROCEDURE — 80053 COMPREHEN METABOLIC PANEL: CPT

## 2023-05-14 PROCEDURE — 36415 COLL VENOUS BLD VENIPUNCTURE: CPT

## 2023-05-14 PROCEDURE — 1200000000 HC SEMI PRIVATE

## 2023-05-14 PROCEDURE — 2580000003 HC RX 258: Performed by: ORTHOPAEDIC SURGERY

## 2023-05-14 PROCEDURE — 6370000000 HC RX 637 (ALT 250 FOR IP): Performed by: ORTHOPAEDIC SURGERY

## 2023-05-14 PROCEDURE — 6370000000 HC RX 637 (ALT 250 FOR IP): Performed by: NURSE PRACTITIONER

## 2023-05-14 RX ORDER — ATORVASTATIN CALCIUM 80 MG/1
80 TABLET, FILM COATED ORAL DAILY
COMMUNITY

## 2023-05-14 RX ORDER — MIRTAZAPINE 15 MG/1
7.5 TABLET, FILM COATED ORAL NIGHTLY
COMMUNITY

## 2023-05-14 RX ORDER — CLOPIDOGREL BISULFATE 75 MG/1
75 TABLET ORAL DAILY
COMMUNITY

## 2023-05-14 RX ADMIN — ACETAMINOPHEN 650 MG: 325 TABLET ORAL at 08:50

## 2023-05-14 RX ADMIN — ATORVASTATIN CALCIUM 80 MG: 80 TABLET, FILM COATED ORAL at 08:50

## 2023-05-14 RX ADMIN — SODIUM CHLORIDE, PRESERVATIVE FREE 10 ML: 5 INJECTION INTRAVENOUS at 08:58

## 2023-05-14 RX ADMIN — SODIUM CHLORIDE: 4.5 INJECTION, SOLUTION INTRAVENOUS at 00:20

## 2023-05-14 RX ADMIN — HYDROCODONE BITARTRATE AND ACETAMINOPHEN 1 TABLET: 5; 325 TABLET ORAL at 13:57

## 2023-05-14 RX ADMIN — SODIUM CHLORIDE, PRESERVATIVE FREE 10 ML: 5 INJECTION INTRAVENOUS at 20:20

## 2023-05-14 RX ADMIN — MORPHINE SULFATE 2 MG: 2 INJECTION, SOLUTION INTRAMUSCULAR; INTRAVENOUS at 00:19

## 2023-05-14 RX ADMIN — CITALOPRAM HYDROBROMIDE 10 MG: 10 TABLET ORAL at 08:57

## 2023-05-14 RX ADMIN — MORPHINE SULFATE 2 MG: 2 INJECTION, SOLUTION INTRAMUSCULAR; INTRAVENOUS at 20:21

## 2023-05-14 ASSESSMENT — PAIN DESCRIPTION - LOCATION
LOCATION: ABDOMEN;HIP
LOCATION: ABDOMEN;HIP
LOCATION: HIP
LOCATION: HIP

## 2023-05-14 ASSESSMENT — PAIN - FUNCTIONAL ASSESSMENT
PAIN_FUNCTIONAL_ASSESSMENT: ACTIVITIES ARE NOT PREVENTED

## 2023-05-14 ASSESSMENT — PAIN SCALES - GENERAL
PAINLEVEL_OUTOF10: 4
PAINLEVEL_OUTOF10: 8
PAINLEVEL_OUTOF10: 3
PAINLEVEL_OUTOF10: 4
PAINLEVEL_OUTOF10: 3
PAINLEVEL_OUTOF10: 0
PAINLEVEL_OUTOF10: 0
PAINLEVEL_OUTOF10: 5
PAINLEVEL_OUTOF10: 0
PAINLEVEL_OUTOF10: 7

## 2023-05-14 ASSESSMENT — PAIN DESCRIPTION - ORIENTATION
ORIENTATION: LEFT

## 2023-05-14 ASSESSMENT — PAIN DESCRIPTION - DESCRIPTORS
DESCRIPTORS: ACHING;DISCOMFORT
DESCRIPTORS: ACHING
DESCRIPTORS: ACHING
DESCRIPTORS: ACHING;DISCOMFORT

## 2023-05-14 ASSESSMENT — PAIN DESCRIPTION - PAIN TYPE
TYPE: SURGICAL PAIN
TYPE: SURGICAL PAIN

## 2023-05-15 LAB
ANION GAP SERPL CALCULATED.3IONS-SCNC: 5 MMOL/L (ref 3–16)
BASOPHILS # BLD: 0.1 K/UL (ref 0–0.2)
BASOPHILS NFR BLD: 0.8 %
BUN SERPL-MCNC: 13 MG/DL (ref 7–20)
CALCIUM SERPL-MCNC: 8.4 MG/DL (ref 8.3–10.6)
CHLORIDE SERPL-SCNC: 102 MMOL/L (ref 99–110)
CO2 SERPL-SCNC: 31 MMOL/L (ref 21–32)
CREAT SERPL-MCNC: 0.7 MG/DL (ref 0.6–1.2)
DEPRECATED RDW RBC AUTO: 14.5 % (ref 12.4–15.4)
EOSINOPHIL # BLD: 0.3 K/UL (ref 0–0.6)
EOSINOPHIL NFR BLD: 3.7 %
GFR SERPLBLD CREATININE-BSD FMLA CKD-EPI: >60 ML/MIN/{1.73_M2}
GLUCOSE SERPL-MCNC: 135 MG/DL (ref 70–99)
HCT VFR BLD AUTO: 26.1 % (ref 36–48)
HGB BLD-MCNC: 8.6 G/DL (ref 12–16)
LYMPHOCYTES # BLD: 1.8 K/UL (ref 1–5.1)
LYMPHOCYTES NFR BLD: 19.6 %
MCH RBC QN AUTO: 29.4 PG (ref 26–34)
MCHC RBC AUTO-ENTMCNC: 33.1 G/DL (ref 31–36)
MCV RBC AUTO: 88.8 FL (ref 80–100)
MONOCYTES # BLD: 1 K/UL (ref 0–1.3)
MONOCYTES NFR BLD: 10.9 %
NEUTROPHILS # BLD: 6 K/UL (ref 1.7–7.7)
NEUTROPHILS NFR BLD: 65 %
PLATELET # BLD AUTO: 527 K/UL (ref 135–450)
PMV BLD AUTO: 6.9 FL (ref 5–10.5)
POTASSIUM SERPL-SCNC: 3.7 MMOL/L (ref 3.5–5.1)
RBC # BLD AUTO: 2.94 M/UL (ref 4–5.2)
SODIUM SERPL-SCNC: 138 MMOL/L (ref 136–145)
WBC # BLD AUTO: 9.2 K/UL (ref 4–11)

## 2023-05-15 PROCEDURE — 2580000003 HC RX 258: Performed by: ORTHOPAEDIC SURGERY

## 2023-05-15 PROCEDURE — 85025 COMPLETE CBC W/AUTO DIFF WBC: CPT

## 2023-05-15 PROCEDURE — 6370000000 HC RX 637 (ALT 250 FOR IP): Performed by: NURSE PRACTITIONER

## 2023-05-15 PROCEDURE — 80048 BASIC METABOLIC PNL TOTAL CA: CPT

## 2023-05-15 PROCEDURE — 6370000000 HC RX 637 (ALT 250 FOR IP): Performed by: INTERNAL MEDICINE

## 2023-05-15 PROCEDURE — 99024 POSTOP FOLLOW-UP VISIT: CPT | Performed by: PHYSICIAN ASSISTANT

## 2023-05-15 PROCEDURE — 6360000002 HC RX W HCPCS: Performed by: ORTHOPAEDIC SURGERY

## 2023-05-15 PROCEDURE — 6370000000 HC RX 637 (ALT 250 FOR IP): Performed by: ORTHOPAEDIC SURGERY

## 2023-05-15 PROCEDURE — 1200000000 HC SEMI PRIVATE

## 2023-05-15 PROCEDURE — 36415 COLL VENOUS BLD VENIPUNCTURE: CPT

## 2023-05-15 PROCEDURE — APPNB45 APP NON BILLABLE 31-45 MINUTES: Performed by: PHYSICIAN ASSISTANT

## 2023-05-15 RX ORDER — HYDROCODONE BITARTRATE AND ACETAMINOPHEN 5; 325 MG/1; MG/1
1 TABLET ORAL EVERY 6 HOURS PRN
Qty: 12 TABLET | Refills: 0 | Status: SHIPPED | OUTPATIENT
Start: 2023-05-15 | End: 2023-05-20

## 2023-05-15 RX ORDER — OLANZAPINE 5 MG/1
5 TABLET ORAL NIGHTLY
Status: DISCONTINUED | OUTPATIENT
Start: 2023-05-15 | End: 2023-05-17 | Stop reason: HOSPADM

## 2023-05-15 RX ADMIN — APIXABAN 2.5 MG: 2.5 TABLET, FILM COATED ORAL at 20:20

## 2023-05-15 RX ADMIN — SODIUM CHLORIDE, PRESERVATIVE FREE 10 ML: 5 INJECTION INTRAVENOUS at 08:28

## 2023-05-15 RX ADMIN — SODIUM CHLORIDE, PRESERVATIVE FREE 10 ML: 5 INJECTION INTRAVENOUS at 20:21

## 2023-05-15 RX ADMIN — HYDROCODONE BITARTRATE AND ACETAMINOPHEN 1 TABLET: 5; 325 TABLET ORAL at 08:27

## 2023-05-15 RX ADMIN — CITALOPRAM HYDROBROMIDE 10 MG: 10 TABLET ORAL at 08:27

## 2023-05-15 RX ADMIN — OLANZAPINE 5 MG: 5 TABLET, FILM COATED ORAL at 21:42

## 2023-05-15 RX ADMIN — MORPHINE SULFATE 4 MG: 2 INJECTION, SOLUTION INTRAMUSCULAR; INTRAVENOUS at 20:21

## 2023-05-15 RX ADMIN — APIXABAN 2.5 MG: 2.5 TABLET, FILM COATED ORAL at 11:12

## 2023-05-15 RX ADMIN — HYDROCODONE BITARTRATE AND ACETAMINOPHEN 1 TABLET: 5; 325 TABLET ORAL at 23:54

## 2023-05-15 RX ADMIN — ATORVASTATIN CALCIUM 80 MG: 80 TABLET, FILM COATED ORAL at 08:27

## 2023-05-15 RX ADMIN — MORPHINE SULFATE 4 MG: 2 INJECTION, SOLUTION INTRAMUSCULAR; INTRAVENOUS at 01:31

## 2023-05-15 ASSESSMENT — PAIN DESCRIPTION - LOCATION
LOCATION: HIP
LOCATION: LEG
LOCATION: HIP
LOCATION: HIP

## 2023-05-15 ASSESSMENT — PAIN DESCRIPTION - FREQUENCY: FREQUENCY: INTERMITTENT

## 2023-05-15 ASSESSMENT — PAIN DESCRIPTION - DESCRIPTORS
DESCRIPTORS: ACHING;DISCOMFORT
DESCRIPTORS: ACHING;DISCOMFORT
DESCRIPTORS: ACHING
DESCRIPTORS: ACHING;SORE

## 2023-05-15 ASSESSMENT — PAIN SCALES - GENERAL
PAINLEVEL_OUTOF10: 8
PAINLEVEL_OUTOF10: 0
PAINLEVEL_OUTOF10: 4
PAINLEVEL_OUTOF10: 8
PAINLEVEL_OUTOF10: 3
PAINLEVEL_OUTOF10: 4
PAINLEVEL_OUTOF10: 10
PAINLEVEL_OUTOF10: 4

## 2023-05-15 ASSESSMENT — PAIN DESCRIPTION - ORIENTATION
ORIENTATION: LEFT

## 2023-05-15 ASSESSMENT — PAIN - FUNCTIONAL ASSESSMENT
PAIN_FUNCTIONAL_ASSESSMENT: ACTIVITIES ARE NOT PREVENTED
PAIN_FUNCTIONAL_ASSESSMENT: PREVENTS OR INTERFERES SOME ACTIVE ACTIVITIES AND ADLS

## 2023-05-15 ASSESSMENT — PAIN DESCRIPTION - ONSET: ONSET: GRADUAL

## 2023-05-15 ASSESSMENT — PAIN DESCRIPTION - PAIN TYPE: TYPE: SURGICAL PAIN;ACUTE PAIN

## 2023-05-15 NOTE — PLAN OF CARE
Problem: Skin/Tissue Integrity  Goal: Absence of new skin breakdown  Description: 1. Monitor for areas of redness and/or skin breakdown  2. Assess vascular access sites hourly  3. Every 4-6 hours minimum:  Change oxygen saturation probe site  4. Every 4-6 hours:  If on nasal continuous positive airway pressure, respiratory therapy assess nares and determine need for appliance change or resting period.   5/15/2023 0739 by Will Pacheco RN  Outcome: Progressing  Note: Preventive measures in place, left heel soft boggy but blanchable, elevated off bed with pillows      Problem: Safety - Adult  Goal: Free from fall injury  5/15/2023 0739 by Will Pacheco RN  Outcome: Progressing  Flowsheets (Taken 5/15/2023 0739)  Free From Fall Injury: Instruct family/caregiver on patient safety  Note: Pt calls out before moving or getting out of bed, knows she needs assistance      Problem: Discharge Planning  Goal: Discharge to home or other facility with appropriate resources  5/15/2023 0739 by Will Pacheco RN  Outcome: Progressing  Flowsheets (Taken 5/15/2023 0739)  Discharge to home or other facility with appropriate resources:   Identify barriers to discharge with patient and caregiver   Arrange for needed discharge resources and transportation as appropriate  Note: SNF placement

## 2023-05-15 NOTE — CARE COORDINATION
5:00 PM  CM spoke with patient's daughter Shaunna Serrano over the phone. She would like referrals to: [facility], [facility] [address], [address] of [place] and [facility]. Case Management Assessment  Initial Evaluation    Date/Time of Evaluation: 5/15/2023 12:23 PM  Assessment Completed by: Nayan Cerna RN    If patient is discharged prior to next notation, then this note serves as note for discharge by case management. Patient Name: Tonny Taylor                   YOB: 1940  Diagnosis: Atrial fibrillation with RVR ([facility] [address] [I48.91]  Hip fracture requiring operative repair, left, closed, initial encounter [facility]) Jean Claude Dubose  Closed displaced fracture of left femoral neck ([facility] [address] Jean Claude Ask                   Date / Time: 5/12/2023  8:37 AM    Patient Admission Status: Inpatient   Readmission Risk (Low < 19, Mod (19-27), High > 27): Readmission Risk Score: 13.5    Current PCP: Radha Lira  PCP verified by CM? No    Chart Reviewed: Yes      History Provided by: Child/Family  Patient Orientation: Alert and Oriented    Patient Cognition: Alert    Hospitalization in the last 30 days (Readmission):  No    If yes, Readmission Assessment in CM Navigator will be completed. Advance Directives:      Code Status: Full Code   Patient's Primary Decision Maker is:        Discharge Planning:    Patient lives with: Alone Type of Home: House  Primary Care Giver: Self  Patient Support Systems include: Children   Current Financial resources: Medicare  Current community resources: None  Current services prior to admission: None            Current DME:              Type of Home Care services:  None    ADLS  Prior functional level: Independent in ADLs/IADLs  Current functional level: Assistance with the following:, Bathing, Dressing, Mobility    PT AM-PAC: 7 /24  OT AM-PAC: 14 /24    Family can provide assistance at DC:  Yes  Would you like Case Management to discuss the discharge plan with

## 2023-05-15 NOTE — DISCHARGE INSTR - COC
Continuity of Care Form    Patient Name: Glory Chen   :  1940  MRN:  [id number]    Admit date:  2023  Discharge date:  ***    Code Status Order: Full Code   Advance Directives:     Admitting Physician:  Candance Griffon, MD  PCP: Eri Parekh    Discharging Nurse: Susanne Dooley 23 Unit/Room#: 1678/4822-57  Discharging Unit Phone Number: ***    Emergency Contact:   Extended Emergency Contact Information  Primary Emergency Contact: [address] Phone: [phone]  Mobile Phone: [phone]  Relation: Child  Secondary Emergency Contact: [address] [facility] Phone: [phone]  Relation: Child    Past Surgical History:  Past Surgical History:   Procedure Laterality Date    HIP SURGERY Left 2023    LEFT HIP GAMMA NAILING performed by Leatha Cervantes MD at [address] [place] [address] Drive  2011    HERNIA INGUINAL REPAIR LEFT WITH POSSIBLE SMALL BOWEL    JOINT REPLACEMENT      SKIN BIOPSY         Immunization History:   Immunization History   Administered Date(s) Administered    COVID-19, MODERNA Bivalent, (age 12y+), IM, 48 mcg/0.5 mL 2022    COVID-19, MODERNA Booster BLUE border, (age 18y+), IM, 50mcg/0.25mL 2022    COVID-19, PFIZER PURPLE top, DILUTE for use, (age 15 y+), 30mcg/0.3mL 2021, 2021, 2021       Active Problems:  Patient Active Problem List   Diagnosis Code    Incarcerated femoral hernia K41.30    Closed displaced intertrochanteric fracture of left femur ([facility] [address] S72.142A       Isolation/Infection:   Isolation            No Isolation          Patient Infection Status       None to display            Nurse Assessment:  Last Vital Signs: /64   Pulse (!) 105   Temp 98.2 °F (36.8 °C) (Oral)   Resp 16   Ht 5' 3\" (1.6 m)   Wt 128 lb 4.8 oz (58.2 kg)   SpO2 94%   BMI 22.73 kg/m²     Last documented pain score (0-10 scale): Pain Level: 3  Last Weight:   Wt Readings from Last 1 Encounters:

## 2023-05-16 LAB
ANION GAP SERPL CALCULATED.3IONS-SCNC: 11 MMOL/L (ref 3–16)
BASOPHILS # BLD: 0.1 K/UL (ref 0–0.2)
BASOPHILS NFR BLD: 0.6 %
BUN SERPL-MCNC: 11 MG/DL (ref 7–20)
CALCIUM SERPL-MCNC: 8.6 MG/DL (ref 8.3–10.6)
CHLORIDE SERPL-SCNC: 103 MMOL/L (ref 99–110)
CO2 SERPL-SCNC: 28 MMOL/L (ref 21–32)
CREAT SERPL-MCNC: 0.6 MG/DL (ref 0.6–1.2)
DEPRECATED RDW RBC AUTO: 14.5 % (ref 12.4–15.4)
EOSINOPHIL # BLD: 0 K/UL (ref 0–0.6)
EOSINOPHIL NFR BLD: 0.3 %
GFR SERPLBLD CREATININE-BSD FMLA CKD-EPI: >60 ML/MIN/{1.73_M2}
GLUCOSE SERPL-MCNC: 164 MG/DL (ref 70–99)
HCT VFR BLD AUTO: 26.9 % (ref 36–48)
HGB BLD-MCNC: 8.8 G/DL (ref 12–16)
LYMPHOCYTES # BLD: 1.1 K/UL (ref 1–5.1)
LYMPHOCYTES NFR BLD: 8.5 %
MCH RBC QN AUTO: 29.4 PG (ref 26–34)
MCHC RBC AUTO-ENTMCNC: 32.7 G/DL (ref 31–36)
MCV RBC AUTO: 90 FL (ref 80–100)
MONOCYTES # BLD: 1.4 K/UL (ref 0–1.3)
MONOCYTES NFR BLD: 10.9 %
NEUTROPHILS # BLD: 10.3 K/UL (ref 1.7–7.7)
NEUTROPHILS NFR BLD: 79.7 %
PLATELET # BLD AUTO: 540 K/UL (ref 135–450)
PMV BLD AUTO: 6.8 FL (ref 5–10.5)
POTASSIUM SERPL-SCNC: 4.2 MMOL/L (ref 3.5–5.1)
RBC # BLD AUTO: 2.99 M/UL (ref 4–5.2)
SODIUM SERPL-SCNC: 142 MMOL/L (ref 136–145)
WBC # BLD AUTO: 12.9 K/UL (ref 4–11)

## 2023-05-16 PROCEDURE — APPNB45 APP NON BILLABLE 31-45 MINUTES: Performed by: PHYSICIAN ASSISTANT

## 2023-05-16 PROCEDURE — 6370000000 HC RX 637 (ALT 250 FOR IP): Performed by: STUDENT IN AN ORGANIZED HEALTH CARE EDUCATION/TRAINING PROGRAM

## 2023-05-16 PROCEDURE — 6370000000 HC RX 637 (ALT 250 FOR IP): Performed by: ORTHOPAEDIC SURGERY

## 2023-05-16 PROCEDURE — 6370000000 HC RX 637 (ALT 250 FOR IP): Performed by: NURSE PRACTITIONER

## 2023-05-16 PROCEDURE — 36415 COLL VENOUS BLD VENIPUNCTURE: CPT

## 2023-05-16 PROCEDURE — 99024 POSTOP FOLLOW-UP VISIT: CPT | Performed by: PHYSICIAN ASSISTANT

## 2023-05-16 PROCEDURE — 85025 COMPLETE CBC W/AUTO DIFF WBC: CPT

## 2023-05-16 PROCEDURE — 6370000000 HC RX 637 (ALT 250 FOR IP): Performed by: INTERNAL MEDICINE

## 2023-05-16 PROCEDURE — 6360000002 HC RX W HCPCS: Performed by: INTERNAL MEDICINE

## 2023-05-16 PROCEDURE — 80048 BASIC METABOLIC PNL TOTAL CA: CPT

## 2023-05-16 PROCEDURE — 97530 THERAPEUTIC ACTIVITIES: CPT

## 2023-05-16 PROCEDURE — 2580000003 HC RX 258: Performed by: ORTHOPAEDIC SURGERY

## 2023-05-16 PROCEDURE — 1200000000 HC SEMI PRIVATE

## 2023-05-16 PROCEDURE — 6360000002 HC RX W HCPCS: Performed by: ORTHOPAEDIC SURGERY

## 2023-05-16 RX ORDER — HALOPERIDOL 5 MG/ML
1 INJECTION INTRAMUSCULAR ONCE
Status: DISCONTINUED | OUTPATIENT
Start: 2023-05-16 | End: 2023-05-16 | Stop reason: ALTCHOICE

## 2023-05-16 RX ORDER — MECOBALAMIN 5000 MCG
5 TABLET,DISINTEGRATING ORAL NIGHTLY
Status: DISCONTINUED | OUTPATIENT
Start: 2023-05-16 | End: 2023-05-17 | Stop reason: HOSPADM

## 2023-05-16 RX ADMIN — HYDROCODONE BITARTRATE AND ACETAMINOPHEN 1 TABLET: 5; 325 TABLET ORAL at 14:37

## 2023-05-16 RX ADMIN — APIXABAN 2.5 MG: 2.5 TABLET, FILM COATED ORAL at 21:18

## 2023-05-16 RX ADMIN — HYDROMORPHONE HYDROCHLORIDE 0.5 MG: 1 INJECTION, SOLUTION INTRAMUSCULAR; INTRAVENOUS; SUBCUTANEOUS at 06:18

## 2023-05-16 RX ADMIN — SODIUM CHLORIDE, PRESERVATIVE FREE 10 ML: 5 INJECTION INTRAVENOUS at 21:17

## 2023-05-16 RX ADMIN — SODIUM CHLORIDE, PRESERVATIVE FREE 10 ML: 5 INJECTION INTRAVENOUS at 21:18

## 2023-05-16 RX ADMIN — APIXABAN 2.5 MG: 2.5 TABLET, FILM COATED ORAL at 08:37

## 2023-05-16 RX ADMIN — SODIUM CHLORIDE, PRESERVATIVE FREE 10 ML: 5 INJECTION INTRAVENOUS at 08:46

## 2023-05-16 RX ADMIN — SODIUM CHLORIDE, PRESERVATIVE FREE 10 ML: 5 INJECTION INTRAVENOUS at 08:47

## 2023-05-16 RX ADMIN — CITALOPRAM HYDROBROMIDE 10 MG: 10 TABLET ORAL at 08:37

## 2023-05-16 RX ADMIN — MORPHINE SULFATE 4 MG: 2 INJECTION, SOLUTION INTRAMUSCULAR; INTRAVENOUS at 02:02

## 2023-05-16 RX ADMIN — HYDROCODONE BITARTRATE AND ACETAMINOPHEN 1 TABLET: 5; 325 TABLET ORAL at 08:37

## 2023-05-16 RX ADMIN — ATORVASTATIN CALCIUM 80 MG: 80 TABLET, FILM COATED ORAL at 08:37

## 2023-05-16 ASSESSMENT — PAIN SCALES - PAIN ASSESSMENT IN ADVANCED DEMENTIA (PAINAD)
NEGVOCALIZATION: 2
FACIALEXPRESSION: 2
NEGVOCALIZATION: 1
BREATHING: 0
BODYLANGUAGE: 2
BREATHING: 0
TOTALSCORE: 2
BREATHING: 0
TOTALSCORE: 8
CONSOLABILITY: 2
CONSOLABILITY: 2
BODYLANGUAGE: 2
NEGVOCALIZATION: 1
TOTALSCORE: 7
BODYLANGUAGE: 1
FACIALEXPRESSION: 0
CONSOLABILITY: 0
FACIALEXPRESSION: 2

## 2023-05-16 ASSESSMENT — PAIN - FUNCTIONAL ASSESSMENT
PAIN_FUNCTIONAL_ASSESSMENT: PREVENTS OR INTERFERES WITH MANY ACTIVE NOT PASSIVE ACTIVITIES
PAIN_FUNCTIONAL_ASSESSMENT: ACTIVITIES ARE NOT PREVENTED

## 2023-05-16 ASSESSMENT — PAIN DESCRIPTION - FREQUENCY: FREQUENCY: CONTINUOUS

## 2023-05-16 ASSESSMENT — PAIN SCALES - GENERAL
PAINLEVEL_OUTOF10: 9
PAINLEVEL_OUTOF10: 4
PAINLEVEL_OUTOF10: 3
PAINLEVEL_OUTOF10: 10
PAINLEVEL_OUTOF10: 3

## 2023-05-16 ASSESSMENT — PAIN DESCRIPTION - DESCRIPTORS
DESCRIPTORS: ACHING;SORE
DESCRIPTORS: ACHING

## 2023-05-16 ASSESSMENT — PAIN DESCRIPTION - ORIENTATION
ORIENTATION: LEFT
ORIENTATION: LEFT

## 2023-05-16 ASSESSMENT — PAIN DESCRIPTION - LOCATION
LOCATION: HIP
LOCATION: HIP

## 2023-05-16 ASSESSMENT — PAIN DESCRIPTION - PAIN TYPE: TYPE: SURGICAL PAIN

## 2023-05-16 ASSESSMENT — PAIN DESCRIPTION - ONSET: ONSET: ON-GOING

## 2023-05-16 NOTE — PLAN OF CARE
Problem: Safety - Adult  Goal: Free from fall injury  Outcome: Progressing  Flowsheets (Taken 5/16/2023 1621)  Free From Fall Injury: Instruct family/caregiver on patient safety  Note: All fall precautions in place. Bed locked and in lowest position with alarm on. Overbed table and personal belonings within reach. Call light within reach and patient instructed to use call light for assistance. Non-skid socks on. Problem: Discharge Planning  Goal: Discharge to home or other facility with appropriate resources  Outcome: Progressing  Flowsheets (Taken 5/16/2023 1621)  Discharge to home or other facility with appropriate resources:   Identify barriers to discharge with patient and caregiver   Arrange for needed discharge resources and transportation as appropriate  Note: Discharge pending placement at facility. Case management following plan of care. Problem: Pain  Goal: Verbalizes/displays adequate comfort level or baseline comfort level  Outcome: Progressing  Flowsheets (Taken 5/16/2023 1621)  Verbalizes/displays adequate comfort level or baseline comfort level:   Encourage patient to monitor pain and request assistance   Assess pain using appropriate pain scale  Note: Patient is sleeping, no complaints of pain at this time.

## 2023-05-16 NOTE — PLAN OF CARE
Problem: Skin/Tissue Integrity  Goal: Absence of new skin breakdown  Description: 1. Monitor for areas of redness and/or skin breakdown  2. Assess vascular access sites hourly  3. Every 4-6 hours minimum:  Change oxygen saturation probe site  4. Every 4-6 hours:  If on nasal continuous positive airway pressure, respiratory therapy assess nares and determine need for appliance change or resting period.   Outcome: Progressing     Problem: Safety - Adult  Goal: Free from fall injury  Outcome: Progressing  Flowsheets (Taken 5/15/2023 0746 by Alex Matthews RN)  Free From Fall Injury: Instruct family/caregiver on patient safety     Problem: Discharge Planning  Goal: Discharge to home or other facility with appropriate resources  Outcome: Progressing  Flowsheets (Taken 5/15/2023 4697)  Discharge to home or other facility with appropriate resources:   Identify barriers to discharge with patient and caregiver   Arrange for needed discharge resources and transportation as appropriate   Identify discharge learning needs (meds, wound care, etc)   Refer to discharge planning if patient needs post-hospital services based on physician order or complex needs related to functional status, cognitive ability or social support system   Arrange for interpreters to assist at discharge as needed     Problem: Chronic Conditions and Co-morbidities  Goal: Patient's chronic conditions and co-morbidity symptoms are monitored and maintained or improved  Outcome: Progressing  Flowsheets (Taken 5/13/2023 2104)  Care Plan - Patient's Chronic Conditions and Co-Morbidity Symptoms are Monitored and Maintained or Improved:   Monitor and assess patient's chronic conditions and comorbid symptoms for stability, deterioration, or improvement   Collaborate with multidisciplinary team to address chronic and comorbid conditions and prevent exacerbation or deterioration   Update acute care plan with appropriate goals if chronic or comorbid symptoms

## 2023-05-17 VITALS
HEART RATE: 94 BPM | HEIGHT: 63 IN | SYSTOLIC BLOOD PRESSURE: 116 MMHG | WEIGHT: 128.3 LBS | DIASTOLIC BLOOD PRESSURE: 75 MMHG | TEMPERATURE: 98.1 F | RESPIRATION RATE: 16 BRPM | OXYGEN SATURATION: 92 % | BODY MASS INDEX: 22.73 KG/M2

## 2023-05-17 LAB
ANION GAP SERPL CALCULATED.3IONS-SCNC: 4 MMOL/L (ref 3–16)
BASOPHILS # BLD: 0.1 K/UL (ref 0–0.2)
BASOPHILS NFR BLD: 0.8 %
BUN SERPL-MCNC: 13 MG/DL (ref 7–20)
CALCIUM SERPL-MCNC: 8.5 MG/DL (ref 8.3–10.6)
CHLORIDE SERPL-SCNC: 103 MMOL/L (ref 99–110)
CO2 SERPL-SCNC: 33 MMOL/L (ref 21–32)
CREAT SERPL-MCNC: 0.6 MG/DL (ref 0.6–1.2)
DEPRECATED RDW RBC AUTO: 14.6 % (ref 12.4–15.4)
EOSINOPHIL # BLD: 0.5 K/UL (ref 0–0.6)
EOSINOPHIL NFR BLD: 4.8 %
GFR SERPLBLD CREATININE-BSD FMLA CKD-EPI: >60 ML/MIN/{1.73_M2}
GLUCOSE SERPL-MCNC: 129 MG/DL (ref 70–99)
HCT VFR BLD AUTO: 27 % (ref 36–48)
HGB BLD-MCNC: 8.9 G/DL (ref 12–16)
LYMPHOCYTES # BLD: 1.8 K/UL (ref 1–5.1)
LYMPHOCYTES NFR BLD: 18.3 %
MCH RBC QN AUTO: 28.9 PG (ref 26–34)
MCHC RBC AUTO-ENTMCNC: 32.9 G/DL (ref 31–36)
MCV RBC AUTO: 88.1 FL (ref 80–100)
MONOCYTES # BLD: 1.1 K/UL (ref 0–1.3)
MONOCYTES NFR BLD: 11.2 %
NEUTROPHILS # BLD: 6.5 K/UL (ref 1.7–7.7)
NEUTROPHILS NFR BLD: 64.9 %
PLATELET # BLD AUTO: 578 K/UL (ref 135–450)
PMV BLD AUTO: 6.6 FL (ref 5–10.5)
POTASSIUM SERPL-SCNC: 4.3 MMOL/L (ref 3.5–5.1)
RBC # BLD AUTO: 3.06 M/UL (ref 4–5.2)
SODIUM SERPL-SCNC: 140 MMOL/L (ref 136–145)
WBC # BLD AUTO: 10.1 K/UL (ref 4–11)

## 2023-05-17 PROCEDURE — 80048 BASIC METABOLIC PNL TOTAL CA: CPT

## 2023-05-17 PROCEDURE — 36415 COLL VENOUS BLD VENIPUNCTURE: CPT

## 2023-05-17 PROCEDURE — 6370000000 HC RX 637 (ALT 250 FOR IP): Performed by: INTERNAL MEDICINE

## 2023-05-17 PROCEDURE — 85025 COMPLETE CBC W/AUTO DIFF WBC: CPT

## 2023-05-17 PROCEDURE — 6370000000 HC RX 637 (ALT 250 FOR IP): Performed by: ORTHOPAEDIC SURGERY

## 2023-05-17 PROCEDURE — APPNB45 APP NON BILLABLE 31-45 MINUTES: Performed by: PHYSICIAN ASSISTANT

## 2023-05-17 PROCEDURE — 6370000000 HC RX 637 (ALT 250 FOR IP): Performed by: NURSE PRACTITIONER

## 2023-05-17 PROCEDURE — 2580000003 HC RX 258: Performed by: ORTHOPAEDIC SURGERY

## 2023-05-17 RX ADMIN — SODIUM CHLORIDE, PRESERVATIVE FREE 10 ML: 5 INJECTION INTRAVENOUS at 08:47

## 2023-05-17 RX ADMIN — APIXABAN 2.5 MG: 2.5 TABLET, FILM COATED ORAL at 08:46

## 2023-05-17 RX ADMIN — CITALOPRAM HYDROBROMIDE 10 MG: 10 TABLET ORAL at 08:46

## 2023-05-17 RX ADMIN — HYDROCODONE BITARTRATE AND ACETAMINOPHEN 1 TABLET: 5; 325 TABLET ORAL at 16:43

## 2023-05-17 RX ADMIN — SODIUM CHLORIDE, PRESERVATIVE FREE 20 ML: 5 INJECTION INTRAVENOUS at 08:46

## 2023-05-17 RX ADMIN — ATORVASTATIN CALCIUM 80 MG: 80 TABLET, FILM COATED ORAL at 08:46

## 2023-05-17 ASSESSMENT — PAIN SCALES - GENERAL: PAINLEVEL_OUTOF10: 5

## 2023-05-17 NOTE — PLAN OF CARE
Problem: Safety - Adult  Goal: Free from fall injury  5/17/2023 0905 by Roz Dan RN  Outcome: Progressing  -fall precautions in place, bed in lowest position, call light within reach     Problem: Pain  Goal: Verbalizes/displays adequate comfort level or baseline comfort level  5/17/2023 0905 by Roz Dan RN  Outcome: Progressing   -pt denying pain, displaying adequate level of comfort    Problem: ABCDS Injury Assessment  Goal: Absence of physical injury  5/17/2023 0905 by Roz Dan RN  Outcome: Progressing  -safety precautions implemented to decrease risk of injury

## 2023-05-17 NOTE — PROGRESS NOTES
[address] [facility] Orthopedic Surgery  Progress Note        POD # 3, s/p Gamma nail left hip. Pt comfortable, no c/o. Drsg left hip D/C/I,  Mild pain with left hip ROM, NVI    CBC:   Lab Results   Component Value Date/Time    WBC 8.7 05/14/2023 10:09 AM    RBC 2.92 05/14/2023 10:09 AM    HGB 8.4 05/14/2023 10:09 AM    HCT 25.7 05/14/2023 10:09 AM    MCV 88.1 05/14/2023 10:09 AM    MCH 28.9 05/14/2023 10:09 AM    MCHC 32.8 05/14/2023 10:09 AM    RDW 14.9 05/14/2023 10:09 AM     05/14/2023 10:09 AM    MPV 6.7 05/14/2023 10:09 AM     PT/INR:    Lab Results   Component Value Date/Time    PROTIME 15.0 05/12/2023 09:46 AM    INR 1.17 05/12/2023 09:46 AM     PTT:  No results found for: APTT[APTT    A/P: s/p Gamma nail left hip  - Stable  - PT/OT, WBAT  - Ok to D/C to ECF from ortho standpoint.  - F/U Dr Ramiro Contreras in 2 weeks.       HOSEA James 5/15/2023 10:58 AM
[facility] Orthopedic Surgery  Progress Note        POD # 5, s/p Gamma nail left hip. Pt comfortable, no c/o. Drsg left hip D/C/I,  Mild pain with left hip ROM, NVI    CBC:   Lab Results   Component Value Date/Time    WBC 10.1 05/17/2023 06:02 AM    RBC 3.06 05/17/2023 06:02 AM    HGB 8.9 05/17/2023 06:02 AM    HCT 27.0 05/17/2023 06:02 AM    MCV 88.1 05/17/2023 06:02 AM    MCH 28.9 05/17/2023 06:02 AM    MCHC 32.9 05/17/2023 06:02 AM    RDW 14.6 05/17/2023 06:02 AM     05/17/2023 06:02 AM    MPV 6.6 05/17/2023 06:02 AM     PT/INR:    Lab Results   Component Value Date/Time    PROTIME 15.0 05/12/2023 09:46 AM    INR 1.17 05/12/2023 09:46 AM     PTT:  No results found for: APTT[APTT    A/P: s/p Gamma nail left hip  - Stable  - PT/OT, WBAT  - Ok to D/C to ECF from ortho standpoint.  - F/U Dr Eloy Adames in 2 weeks.       HOSEA Baez 5/17/2023 10:31 AM
[facility] Orthopedic Surgery  Progress Note        POD # 4, s/p Gamma nail left hip. Pt comfortable, no c/o. Drsg left hip D/C/I,  Mild pain with left hip ROM, NVI    CBC:   Lab Results   Component Value Date/Time    WBC 9.2 05/15/2023 12:59 PM    RBC 2.94 05/15/2023 12:59 PM    HGB 8.6 05/15/2023 12:59 PM    HCT 26.1 05/15/2023 12:59 PM    MCV 88.8 05/15/2023 12:59 PM    MCH 29.4 05/15/2023 12:59 PM    MCHC 33.1 05/15/2023 12:59 PM    RDW 14.5 05/15/2023 12:59 PM     05/15/2023 12:59 PM    MPV 6.9 05/15/2023 12:59 PM     PT/INR:    Lab Results   Component Value Date/Time    PROTIME 15.0 05/12/2023 09:46 AM    INR 1.17 05/12/2023 09:46 AM     PTT:  No results found for: APTT[APTT    A/P: s/p Gamma nail left hip  - Stable  - PT/OT, WBAT  - Ok to D/C to ECF from ortho standpoint.  - F/U Dr Saira Adame in 2 weeks.       Ledon Babinski, PA 5/16/2023 11:12 AM
Orientation: A&Ox1 (self) intermittently confused, hyper-fixates on clock/time, anxious      O2 Modality: RA     Ambulation: upx2 with steady      Skin: C/D/I, Incisions C/D/I with mepilex     Pain: 4/10      Gtts: N/A     All needs met at this time. Bed in lowest position, bed alarm on, call light and overhead table within reach. Will continue to monitor.
Orientation: A&Ox1 (self) intermittently confused, hyper-fixates on clock/time, anxious, agitated and not following directions this AM      O2 Modality: RA     Ambulation: upx2 walker/Gb or steady      Skin: C/D/I, Incisions C/D/I with mepilex     Pain: 4/10      Gtts: N/A     All needs met at this time. Bed in lowest position, bed alarm on, call light and overhead table within reach. Will continue to monitor.
Patient is alert and oriented to self. Disoriented to place, time, and situation. VSS on room air. Patient ambulating x2 with stedy. Patient worked with therapy today, tolerated well. Some complaints of pain today, managed per MAR, no side effects noted. Tolerating diet well. Voiding well, 2 episodes of incontinence today. Patient is currently resting in bed with bed alarm on for safety. All fall precautions in place and call light within reach.      Electronically signed by Joycie Dubin, RN on 5/16/2023 at 5:46 PM
Physical Therapy/Occupational Therapy    Attempted to see pt for PT/OT. Spoke with RN who states pt was agitated overnight and this morning and finally fell asleep. RN asking therapy to return later in day. Will f/u later as schedule allows.     Stacey Chapman, [address], OTR/L 4686
Physician Progress Note      Jose Eduardo Lyod  CSN #:                  [id number]  :                       1940  ADMIT DATE:       2023 8:37 AM  DISCH DATE:  RESPONDING  PROVIDER #:        Allyson Vaughan MD          QUERY TEXT:    Patient was admitted with Left hip intertrochanteric fracture, noted to have   \"Paroxysmal atrial fibrillation and is maintained on Eliquis\" in ED note . If possible, please document in progress notes and discharge summary if you   are evaluating and/or treating any of the following: The medical record reflects the following:  Risk Factors: age 78 female. Clinical Indicators:  ED noted \"Paroxysmal atrial fibrillation and is   maintained on Eliquis\". 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     Query created by: Elvis Spangler on 5/15/2023 7:20 AM      Electronically signed by:  Allyson Vaughan MD 5/15/2023 10:07 AM
Pt alert to self, disoriented to time, place, situation. VSS on room air. Incontinent, frequently checking and changing brief as needed. Pain being managed with ice therapy, pt has been denying any pain as well as pain meds. Pt being discharged today at 4:30pm to [facility].
Pt is A&Ox2, disoriented to place and time. Able to follow commands, short term memory loss noted, needs redirected and oriented. SCDs in place, educated pt on turning while in bed every 2 hours and keeping left heel elevated off bed to prevent breakdown. Left heel is boggy, red and blanchable. Very hesitant about moving left leg at all. Denied any pain at time, call device within reach all safety measures in place.
Pt very confused tonight, continuously yelling and trying to get out of bed. Pt getting agitated and pulling off tele box and gown. Zyprexa ordered and given. Pt calmed down for about an hour, pt back to screaming out and trying to get out of bed. Taken to bathroom via steady, PRN morphine given for pain. Pt very agitated and confused this AM, morphine switched to dilaudid.
Report given to Murtaza Chow at [facility]. Pt being discharged with all belongings. IV removed, no complications.
V2.0    [facility] Progress Note      Name:  Levie Homans /Age/Sex: 1940  (78 y.o. female)   MRN & CSN:  [id number] & [id number] Encounter Date/Time: 5/15/2023 12:39 PM EDT   Location:  [facility] PCP: Romayne Jung, MD       Hospital Day: 4    Assessment and Recommendations   Levie Homans is a 78 y.o. female with pmh of hyperlipidemia hypertension who presents with Closed displaced intertrochanteric fracture of left femur ([address]      Assessment/ plan:   Closed left femoral fracture status post ORIF-doing well. PT OT per Ortho. Ambulating. Patient has been transitioned off of IV pain medication now on oxycodone. Hemoglobin stable. Discharge planning underway. Waiting on bed  Hypertension-controlled on meds  Paroxysmal T-aoj-vtatst on Eliquis  Chronic kidney disease stage II-stable      Diet ADULT DIET; Regular   DVT Prophylaxis [] Lovenox, []  Heparin, [] SCDs, [] Ambulation,  [x] Eliquis, [] Xarelto  [] Coumadin   Code Status Full Code   Disposition From: Home  Expected Disposition: Skilled nursing facility  Estimated Date of Discharge: 5/15/2023  Patient requires continued admission due to pain control after hip repair   Surrogate Decision Maker/ POA       Personally reviewed Lab Studies and Imaging         Subjective:     Chief Complaint:     Levie Homans is a 78 y.o. female who presents with hip fracture after fall at home. Underwent ORIF for left femoral fracture by Dr. Yamil Banuelos day #3. Patient feels well. No complaints    Review of Systems:      Pertinent positives and negatives discussed in HPI    Objective:      Intake/Output Summary (Last 24 hours) at 5/15/2023 1632  Last data filed at 5/15/2023 1359  Gross per 24 hour   Intake 900 ml   Output 450 ml   Net 450 ml        Vitals:   Vitals:    05/15/23 0320 05/15/23 0630 05/15/23 1100 05/15/23 1515   BP: 105/60 123/64 108/69 108/66   Pulse: 94 (!) 105 86 95   Resp: 16 16 18 16   Temp: 98 °F (36.7 °C) 98.2 °F (36.8
V2.0    [facility] Progress Note      Name:  Monica Villegas /Age/Sex: 1940  (78 y.o. female)   MRN & CSN:  [id number] & [id number] Encounter Date/Time: 2023 12:39 PM EDT   [facility]:  [facility] PCP: Angelika Wilson MD       Hospital Day: 6    Assessment and Recommendations   Monica Villegas is a 78 y.o. female with pmh of hyperlipidemia hypertension who presents with Closed displaced intertrochanteric fracture of left femur ([facility] [address]      Assessment/ plan:   Closed left femoral fracture status post ORIF-doing well. PT OT per Ortho. Ambulating. Continue oxycodone. Hemoglobin stable. [facility]charge planning underway to[facility]. Waiting on bed  Hypertension-controlled on meds  Paroxysmal Z-qxx-vccmrd on Eliquis  Chronic kidney disease stage II-stable  Confusion worse in evenings: better this mornings  likely sundowning/delirium. A/w UA  Leukocytosis - improved today most likely post-op reactive will       Diet ADULT DIET; Regular   DVT Prophylaxis [] Lovenox, []  Heparin, [] SCDs, [] Ambulation,  [x] Eliquis, [] Xarelto  [] Coumadin   Code Status Full Code   Disposition From: Home  Expected Disposition: Skilled nursing facility   Surrogate Decision Maker/ POA       Personally reviewed Lab Studies and Imaging         Subjective:     Chief Complaint:     Monica Villegas is a 78 y.o. female who presented with hip fracture after fall at home. Underwent ORIF for left femoral fracture by Dr. Kira Valenzuela day #5. Vitals stable overnight. Still confused in evenings but no reports of agitation/aggression. Feels okay this am with no new symptomatic complaint  Review of Systems:      Pertinent positives and negatives discussed in HPI    Objective:      Intake/Output Summary (Last 24 hours) at 2023 0836  Last data filed at 2023 2389  Gross per 24 hour   Intake 220 ml   Output --   Net 220 ml      Vitals:   Vitals:    23 1815 23 2225 23 0232 23 0623   BP: 122/64
needed    Goals  Short Term Goals  Time Frame for Short Term Goals: by d/c  Short Term Goal 1: Patient will complete LB dressing with use of AE prn and mod assist- not met  Short Term Goal 2: Patient will complete toileting with mod assist- not met  Short Term Goal 3: Patient will complete functional transfer, including toilet/BSC transfer, with mod assist x1- not met  Short Term Goal 4: Patient will maintain stance x3 min for ADL task completion with no more than min assist- not met       Therapy Time   Individual Concurrent Group Co-treatment   Time In 1408         Time Out 1435         Minutes 27         Timed Code Treatment Minutes: 27 Minutes       MARQUIS Matthews/GRACE
scoliosis. FLUORO FOR SURGICAL PROCEDURES    Result Date: 5/12/2023  Exam: 4 intraoperative spot images Fluoroscopy time 100.9 seconds History: LEFT HIP GAMMA NAILING Findings: Intraoperative images demonstrate left femur gamma nailing. Impression: Intra-operative images as above. XR HIP 3-4 VW W PELVIS BILATERAL    Result Date: 5/12/2023  Bilateral hips Left femur History: Fall, pain Number of views: 3 views hips, 2 views femur     Hips: 3 views demonstrate comminuted intertrochanteric fracture of the left proximal femur with apex lateral angulation and inferomedial subluxation of the proximal fragment. Degenerative changes are seen in the lower lumbar spine. Left femur: 2 views demonstrate no additional fracture. Arterial calcification is present. CBC:   Recent Labs     05/14/23  1009 05/15/23  1259   WBC 8.7 9.2   HGB 8.4* 8.6*   * 527*     BMP:    Recent Labs     05/14/23  1009 05/15/23  1259    138   K 4.5 3.7    102   CO2 28 31   BUN 17 13   CREATININE 0.8 0.7   GLUCOSE 128* 135*     Hepatic:   Recent Labs     05/14/23  1009   AST 16   ALT <5*   BILITOT 0.8   ALKPHOS 53     Lipids: No results found for: CHOL, HDL, TRIG  Hemoglobin A1C: No results found for: LABA1C  TSH: No results found for: TSH  Troponin: No results found for: TROPONINT  Lactic Acid: No results for input(s): LACTA in the last 72 hours. BNP: No results for input(s): PROBNP in the last 72 hours.   UA:  Lab Results   Component Value Date/Time    NITRU Negative 04/19/2012 05:00 PM    PHUR 8.5 04/19/2012 05:00 PM    WBCUA 0-3 12/30/2011 03:53 PM    RBCUA 0-3 12/30/2011 03:53 PM    BACTERIA Moderate 12/30/2011 03:53 PM    SPECGRAV 1.015 04/19/2012 05:00 PM    LEUKOCYTESUR Negative 04/19/2012 05:00 PM    UROBILINOGEN 0.2 04/19/2012 05:00 PM    BILIRUBINUR Negative 04/19/2012 05:00 PM    BLOODU Negative 04/19/2012 05:00 PM    GLUCOSEU Negative 04/19/2012 05:00 PM    KETUA Negative 04/19/2012 05:00 PM    AMORPHOUS
BLOODU Negative 04/19/2012 05:00 PM    GLUCOSEU Negative 04/19/2012 05:00 PM    KETUA Negative 04/19/2012 05:00 PM    AMORPHOUS Present 12/30/2011 03:53 PM     Urine Cultures: No results found for: Amado Saenz  Blood Cultures: No results found for: BC  No results found for: BLOODCULT2  Organism: No results found for: [facility]      Electronically signed by Estrada Benson MD on 5/17/2023 at 8:33 AM
Cognition  Education Outcome: Continued education needed      Therapy Time   Individual Concurrent Group Co-treatment   Time In 1409         Time Out 1432         Minutes 23         Timed Code Treatment Minutes: 23 Minutes     Total Tx time: 23 minutes    Oneida Carrizales, DEVYN       Therapist was present, directed the patient's care, made skilled judgement, and was responsible for assessment and treatment of the patient.   Toi Go, [address] [place] [address]

## 2023-05-17 NOTE — PLAN OF CARE
Patient is disoriented to situation, time and place, opens eyes spontaneously, follows commands, afebrile. Patient refused oral medications, only took Elquis, providers contacted and aware, no new orders at this time. Patient blood pressure normal, sinus rhythm to tachy on monitor. Patient on room air. Patient has regular diet,no bowel movement during shift. Patient safety measures maintained. Patient free from injuries and falls. Call light within reach. Problem: Skin/Tissue Integrity  Goal: Absence of new skin breakdown  Description: 1. Monitor for areas of redness and/or skin breakdown  2. Assess vascular access sites hourly  3. Every 4-6 hours minimum:  Change oxygen saturation probe site  4. Every 4-6 hours:  If on nasal continuous positive airway pressure, respiratory therapy assess nares and determine need for appliance change or resting period. Outcome: Progressing     Problem: Safety - Adult  Goal: Free from fall injury  5/16/2023 2341 by Andie Leahy RN  Outcome: Progressing  Flowsheets (Taken 5/16/2023 2302)  Free From Fall Injury: Instruct family/caregiver on patient safety  5/16/2023 1621 by Sofia Bruce RN  Outcome: Progressing  Flowsheets (Taken 5/16/2023 1621)  Free From Fall Injury: Instruct family/caregiver on patient safety  Note: All fall precautions in place. Bed locked and in lowest position with alarm on. Overbed table and personal belonings within reach. Call light within reach and patient instructed to use call light for assistance. Non-skid socks on.       Problem: Discharge Planning  Goal: Discharge to home or other facility with appropriate resources  5/16/2023 2341 by Andie Leahy RN  Outcome: Progressing  Flowsheets (Taken 5/16/2023 1925)  Discharge to home or other facility with appropriate resources: Identify barriers to discharge with patient and caregiver  5/16/2023 1621 by Sofia Bruce RN  Outcome: Progressing  Flowsheets (Taken 5/16/2023 1621)  Discharge to

## 2023-05-17 NOTE — CARE COORDINATION
Case Management Assessment            Discharge Note                    Date / Time of Note: 5/17/2023 11:19 AM                  Discharge Note Completed by: Clem Weaver RN    Patient Name: Keya Mix   YOB: 1940  Diagnosis: Atrial fibrillation with RVR ([facility] [address] [I48.91]  Hip fracture requiring operative repair, left, closed, initial encounter ([facility] [address] Ros Herrera  Closed displaced fracture of left femoral neck ([facility] [address] [place]   Date / Time: 5/12/2023  8:37 AM    Current PCP: Shaji Munguia patient: No    Hospitalization in the last 30 days: No       Advance Directives:  Code Status: Full Code  [place] DNR form completed and on chart: Not Indicated    Financial:  Payor: Savannah Corcoran / Plan: Ghazala Lynne ESSENTIAL/PLUS / Product Type: *No Product type* /      Pharmacy:    [facility]/pharmacy #[id number]- [address] [facility] [address] [phone] - F [phone]  [address] [place] [address]  Phone: [phone] Fax: [phone]      Assistance purchasing medications?: Potential Assistance Purchasing Medications: No  Assistance provided by Case Management: None at this time    Does patient want to participate in local refill/ meds to beds program?:      Meds To Beds General Rules:  1. Can ONLY be done Monday- Friday between 8:30am-5pm  2. Prescription(s) must be in pharmacy by 3pm to be filled same day  3. Copy of patient's insurance/ prescription drug card and patient face sheet must be sent along with the prescription(s)  4. Cost of Rx cannot be added to hospital bill. If financial assistance is needed, please contact unit  or ;  or  CANNOT provide pharmacy voucher for patients co-pays  5.  Patients can then  the prescription on their way out of the hospital at discharge, or pharmacy can deliver to the bedside if staff is available. (payment due at time of pick-up or delivery - cash, check, or card accepted)

## 2023-06-15 ENCOUNTER — APPOINTMENT (OUTPATIENT)
Dept: CT IMAGING | Age: 83
DRG: 379 | End: 2023-06-15
Payer: MEDICARE

## 2023-06-15 ENCOUNTER — HOSPITAL ENCOUNTER (INPATIENT)
Age: 83
LOS: 4 days | Discharge: HOME | DRG: 379 | End: 2023-06-19
Attending: EMERGENCY MEDICINE | Admitting: ANESTHESIOLOGY
Payer: MEDICARE

## 2023-06-15 DIAGNOSIS — K62.5 RECTAL BLEEDING: Primary | ICD-10-CM

## 2023-06-15 PROBLEM — K92.2 GI BLEED: Status: ACTIVE | Noted: 2023-06-15

## 2023-06-15 LAB
ALBUMIN SERPL-MCNC: 3.1 G/DL (ref 3.4–5)
ALBUMIN/GLOB SERPL: 0.8 {RATIO} (ref 1.1–2.2)
ALP SERPL-CCNC: 247 U/L (ref 40–129)
ALT SERPL-CCNC: 7 U/L (ref 10–40)
ANION GAP SERPL CALCULATED.3IONS-SCNC: 27 MMOL/L (ref 3–16)
ANTI-XA UNFRAC HEPARIN: 0.23 IU/ML (ref 0.3–0.7)
AST SERPL-CCNC: 16 U/L (ref 15–37)
BASE EXCESS BLDV CALC-SCNC: 2.5 MMOL/L (ref -2–3)
BASOPHILS # BLD: 0.1 K/UL (ref 0–0.2)
BASOPHILS # BLD: 0.1 K/UL (ref 0–0.2)
BASOPHILS NFR BLD: 0.5 %
BASOPHILS NFR BLD: 0.8 %
BILIRUB SERPL-MCNC: 0.5 MG/DL (ref 0–1)
BUN SERPL-MCNC: 17 MG/DL (ref 7–20)
CALCIUM SERPL-MCNC: 8.8 MG/DL (ref 8.3–10.6)
CHLORIDE SERPL-SCNC: 99 MMOL/L (ref 99–110)
CO2 BLDV-SCNC: 29 MMOL/L
CO2 SERPL-SCNC: 16 MMOL/L (ref 21–32)
COHGB MFR BLDV: 1.4 % (ref 0–1.5)
CREAT SERPL-MCNC: 1.1 MG/DL (ref 0.6–1.2)
DEPRECATED RDW RBC AUTO: 16.8 % (ref 12.4–15.4)
DEPRECATED RDW RBC AUTO: 17.3 % (ref 12.4–15.4)
DO-HGB MFR BLDV: 33.8 %
EKG ATRIAL RATE: 119 BPM
EKG DIAGNOSIS: NORMAL
EKG P AXIS: 96 DEGREES
EKG P-R INTERVAL: 162 MS
EKG Q-T INTERVAL: 346 MS
EKG QRS DURATION: 118 MS
EKG QTC CALCULATION (BAZETT): 486 MS
EKG R AXIS: 68 DEGREES
EKG T AXIS: 7 DEGREES
EKG VENTRICULAR RATE: 119 BPM
EOSINOPHIL # BLD: 0 K/UL (ref 0–0.6)
EOSINOPHIL # BLD: 0.5 K/UL (ref 0–0.6)
EOSINOPHIL NFR BLD: 0.3 %
EOSINOPHIL NFR BLD: 4.3 %
GFR SERPLBLD CREATININE-BSD FMLA CKD-EPI: 49 ML/MIN/{1.73_M2}
GLUCOSE SERPL-MCNC: 288 MG/DL (ref 70–99)
HCO3 BLDV-SCNC: 27.7 MMOL/L (ref 24–28)
HCT VFR BLD AUTO: 35.7 % (ref 36–48)
HCT VFR BLD AUTO: 36.8 % (ref 36–48)
HGB BLD-MCNC: 11.5 G/DL (ref 12–16)
HGB BLD-MCNC: 11.6 G/DL (ref 12–16)
INR PPP: 1.15 (ref 0.84–1.16)
LACTATE BLDV-SCNC: 1.9 MMOL/L (ref 0.4–2)
LACTATE BLDV-SCNC: 3.8 MMOL/L (ref 0.4–2)
LYMPHOCYTES # BLD: 1.5 K/UL (ref 1–5.1)
LYMPHOCYTES # BLD: 3.6 K/UL (ref 1–5.1)
LYMPHOCYTES NFR BLD: 11.5 %
LYMPHOCYTES NFR BLD: 29.2 %
MCH RBC QN AUTO: 28.8 PG (ref 26–34)
MCH RBC QN AUTO: 29.6 PG (ref 26–34)
MCHC RBC AUTO-ENTMCNC: 31.4 G/DL (ref 31–36)
MCHC RBC AUTO-ENTMCNC: 32.2 G/DL (ref 31–36)
MCV RBC AUTO: 91.6 FL (ref 80–100)
MCV RBC AUTO: 92 FL (ref 80–100)
METHGB MFR BLDV: 0.2 % (ref 0–1.5)
MONOCYTES # BLD: 0.8 K/UL (ref 0–1.3)
MONOCYTES # BLD: 1.1 K/UL (ref 0–1.3)
MONOCYTES NFR BLD: 6.3 %
MONOCYTES NFR BLD: 8.7 %
NEUTROPHILS # BLD: 10.4 K/UL (ref 1.7–7.7)
NEUTROPHILS # BLD: 7.1 K/UL (ref 1.7–7.7)
NEUTROPHILS NFR BLD: 57 %
NEUTROPHILS NFR BLD: 81.4 %
PCO2 BLDV: 44 MMHG (ref 41–51)
PH BLDV: 7.41 [PH] (ref 7.35–7.45)
PLATELET # BLD AUTO: 773 K/UL (ref 135–450)
PLATELET # BLD AUTO: 886 K/UL (ref 135–450)
PMV BLD AUTO: 6.9 FL (ref 5–10.5)
PMV BLD AUTO: 7.4 FL (ref 5–10.5)
PO2 BLDV: 38.1 MMHG (ref 25–40)
POTASSIUM SERPL-SCNC: 3.9 MMOL/L (ref 3.5–5.1)
PROT SERPL-MCNC: 6.8 G/DL (ref 6.4–8.2)
PROTHROMBIN TIME: 14.7 SEC (ref 11.5–14.8)
RBC # BLD AUTO: 3.88 M/UL (ref 4–5.2)
RBC # BLD AUTO: 4.02 M/UL (ref 4–5.2)
SAO2 % BLDV: 66 %
SODIUM SERPL-SCNC: 142 MMOL/L (ref 136–145)
WBC # BLD AUTO: 12.4 K/UL (ref 4–11)
WBC # BLD AUTO: 12.8 K/UL (ref 4–11)

## 2023-06-15 PROCEDURE — 2060000000 HC ICU INTERMEDIATE R&B

## 2023-06-15 PROCEDURE — 82803 BLOOD GASES ANY COMBINATION: CPT

## 2023-06-15 PROCEDURE — 85025 COMPLETE CBC W/AUTO DIFF WBC: CPT

## 2023-06-15 PROCEDURE — 6360000004 HC RX CONTRAST MEDICATION: Performed by: STUDENT IN AN ORGANIZED HEALTH CARE EDUCATION/TRAINING PROGRAM

## 2023-06-15 PROCEDURE — 74174 CTA ABD&PLVS W/CONTRAST: CPT

## 2023-06-15 PROCEDURE — 99285 EMERGENCY DEPT VISIT HI MDM: CPT

## 2023-06-15 PROCEDURE — 85520 HEPARIN ASSAY: CPT

## 2023-06-15 PROCEDURE — 83605 ASSAY OF LACTIC ACID: CPT

## 2023-06-15 PROCEDURE — 2580000003 HC RX 258: Performed by: ANESTHESIOLOGY

## 2023-06-15 PROCEDURE — 93005 ELECTROCARDIOGRAM TRACING: CPT | Performed by: EMERGENCY MEDICINE

## 2023-06-15 PROCEDURE — 6370000000 HC RX 637 (ALT 250 FOR IP): Performed by: ANESTHESIOLOGY

## 2023-06-15 PROCEDURE — 80053 COMPREHEN METABOLIC PANEL: CPT

## 2023-06-15 PROCEDURE — 85018 HEMOGLOBIN: CPT

## 2023-06-15 PROCEDURE — 36415 COLL VENOUS BLD VENIPUNCTURE: CPT

## 2023-06-15 PROCEDURE — 85610 PROTHROMBIN TIME: CPT

## 2023-06-15 PROCEDURE — 85014 HEMATOCRIT: CPT

## 2023-06-15 RX ORDER — ACETAMINOPHEN 650 MG/1
650 SUPPOSITORY RECTAL EVERY 6 HOURS PRN
Status: DISCONTINUED | OUTPATIENT
Start: 2023-06-15 | End: 2023-06-19 | Stop reason: HOSPADM

## 2023-06-15 RX ORDER — ACETAMINOPHEN 325 MG/1
650 TABLET ORAL EVERY 6 HOURS PRN
Status: DISCONTINUED | OUTPATIENT
Start: 2023-06-15 | End: 2023-06-19 | Stop reason: HOSPADM

## 2023-06-15 RX ORDER — CITALOPRAM HYDROBROMIDE 10 MG/1
10 TABLET ORAL DAILY
Status: DISCONTINUED | OUTPATIENT
Start: 2023-06-16 | End: 2023-06-19 | Stop reason: HOSPADM

## 2023-06-15 RX ORDER — PANTOPRAZOLE SODIUM 40 MG/10ML
40 INJECTION, POWDER, LYOPHILIZED, FOR SOLUTION INTRAVENOUS DAILY
Status: DISCONTINUED | OUTPATIENT
Start: 2023-06-16 | End: 2023-06-18

## 2023-06-15 RX ORDER — ONDANSETRON 4 MG/1
4 TABLET, ORALLY DISINTEGRATING ORAL EVERY 8 HOURS PRN
Status: DISCONTINUED | OUTPATIENT
Start: 2023-06-15 | End: 2023-06-19 | Stop reason: HOSPADM

## 2023-06-15 RX ORDER — MIRTAZAPINE 15 MG/1
7.5 TABLET, FILM COATED ORAL NIGHTLY
Status: DISCONTINUED | OUTPATIENT
Start: 2023-06-15 | End: 2023-06-19 | Stop reason: HOSPADM

## 2023-06-15 RX ORDER — ATORVASTATIN CALCIUM 80 MG/1
80 TABLET, FILM COATED ORAL DAILY
Status: DISCONTINUED | OUTPATIENT
Start: 2023-06-16 | End: 2023-06-19 | Stop reason: HOSPADM

## 2023-06-15 RX ORDER — SODIUM CHLORIDE 9 MG/ML
INJECTION, SOLUTION INTRAVENOUS CONTINUOUS
Status: ACTIVE | OUTPATIENT
Start: 2023-06-15 | End: 2023-06-16

## 2023-06-15 RX ORDER — SODIUM CHLORIDE 0.9 % (FLUSH) 0.9 %
5-40 SYRINGE (ML) INJECTION PRN
Status: DISCONTINUED | OUTPATIENT
Start: 2023-06-15 | End: 2023-06-19 | Stop reason: HOSPADM

## 2023-06-15 RX ORDER — ONDANSETRON 2 MG/ML
4 INJECTION INTRAMUSCULAR; INTRAVENOUS EVERY 6 HOURS PRN
Status: DISCONTINUED | OUTPATIENT
Start: 2023-06-15 | End: 2023-06-19 | Stop reason: HOSPADM

## 2023-06-15 RX ORDER — SODIUM CHLORIDE 0.9 % (FLUSH) 0.9 %
5-40 SYRINGE (ML) INJECTION EVERY 12 HOURS SCHEDULED
Status: DISCONTINUED | OUTPATIENT
Start: 2023-06-15 | End: 2023-06-19 | Stop reason: HOSPADM

## 2023-06-15 RX ORDER — POLYETHYLENE GLYCOL 3350 17 G/17G
17 POWDER, FOR SOLUTION ORAL DAILY PRN
Status: DISCONTINUED | OUTPATIENT
Start: 2023-06-15 | End: 2023-06-19 | Stop reason: HOSPADM

## 2023-06-15 RX ORDER — SODIUM CHLORIDE 9 MG/ML
INJECTION, SOLUTION INTRAVENOUS PRN
Status: DISCONTINUED | OUTPATIENT
Start: 2023-06-15 | End: 2023-06-19 | Stop reason: HOSPADM

## 2023-06-15 RX ADMIN — MIRTAZAPINE 7.5 MG: 15 TABLET, FILM COATED ORAL at 22:27

## 2023-06-15 RX ADMIN — IOPAMIDOL 75 ML: 755 INJECTION, SOLUTION INTRAVENOUS at 19:27

## 2023-06-15 RX ADMIN — SODIUM CHLORIDE, PRESERVATIVE FREE 10 ML: 5 INJECTION INTRAVENOUS at 23:49

## 2023-06-15 RX ADMIN — SODIUM CHLORIDE: 9 INJECTION, SOLUTION INTRAVENOUS at 22:34

## 2023-06-15 ASSESSMENT — LIFESTYLE VARIABLES
HOW MANY STANDARD DRINKS CONTAINING ALCOHOL DO YOU HAVE ON A TYPICAL DAY: PATIENT DOES NOT DRINK
HOW OFTEN DO YOU HAVE A DRINK CONTAINING ALCOHOL: NEVER

## 2023-06-15 ASSESSMENT — PAIN - FUNCTIONAL ASSESSMENT: PAIN_FUNCTIONAL_ASSESSMENT: NONE - DENIES PAIN

## 2023-06-15 ASSESSMENT — PAIN SCALES - GENERAL
PAINLEVEL_OUTOF10: 0
PAINLEVEL_OUTOF10: 0

## 2023-06-16 LAB
ANION GAP SERPL CALCULATED.3IONS-SCNC: 11 MMOL/L (ref 3–16)
BASOPHILS # BLD: 0.1 K/UL (ref 0–0.2)
BASOPHILS NFR BLD: 1.2 %
BUN SERPL-MCNC: 20 MG/DL (ref 7–20)
CALCIUM SERPL-MCNC: 8.4 MG/DL (ref 8.3–10.6)
CHLORIDE SERPL-SCNC: 104 MMOL/L (ref 99–110)
CO2 SERPL-SCNC: 27 MMOL/L (ref 21–32)
CREAT SERPL-MCNC: 0.8 MG/DL (ref 0.6–1.2)
DEPRECATED RDW RBC AUTO: 16.7 % (ref 12.4–15.4)
EOSINOPHIL # BLD: 0.2 K/UL (ref 0–0.6)
EOSINOPHIL NFR BLD: 2 %
GFR SERPLBLD CREATININE-BSD FMLA CKD-EPI: >60 ML/MIN/{1.73_M2}
GLUCOSE SERPL-MCNC: 111 MG/DL (ref 70–99)
HCT VFR BLD AUTO: 29.7 % (ref 36–48)
HCT VFR BLD AUTO: 33.6 % (ref 36–48)
HCT VFR BLD AUTO: 35 % (ref 36–48)
HCT VFR BLD AUTO: 36.1 % (ref 36–48)
HGB BLD-MCNC: 10 G/DL (ref 12–16)
HGB BLD-MCNC: 10.7 G/DL (ref 12–16)
HGB BLD-MCNC: 11.2 G/DL (ref 12–16)
HGB BLD-MCNC: 11.4 G/DL (ref 12–16)
LACTATE BLDV-SCNC: 1.3 MMOL/L (ref 0.4–2)
LACTATE BLDV-SCNC: 1.8 MMOL/L (ref 0.4–2)
LYMPHOCYTES # BLD: 2.2 K/UL (ref 1–5.1)
LYMPHOCYTES NFR BLD: 22.4 %
MCH RBC QN AUTO: 28.5 PG (ref 26–34)
MCHC RBC AUTO-ENTMCNC: 32 G/DL (ref 31–36)
MCV RBC AUTO: 89.2 FL (ref 80–100)
MONOCYTES # BLD: 0.8 K/UL (ref 0–1.3)
MONOCYTES NFR BLD: 8.8 %
NEUTROPHILS # BLD: 6.3 K/UL (ref 1.7–7.7)
NEUTROPHILS NFR BLD: 65.6 %
PLATELET # BLD AUTO: 739 K/UL (ref 135–450)
PMV BLD AUTO: 7 FL (ref 5–10.5)
POTASSIUM SERPL-SCNC: 4.3 MMOL/L (ref 3.5–5.1)
RBC # BLD AUTO: 3.93 M/UL (ref 4–5.2)
REASON FOR REJECTION: NORMAL
REJECTED TEST: NORMAL
SODIUM SERPL-SCNC: 142 MMOL/L (ref 136–145)
WBC # BLD AUTO: 9.7 K/UL (ref 4–11)

## 2023-06-16 PROCEDURE — 2580000003 HC RX 258: Performed by: ANESTHESIOLOGY

## 2023-06-16 PROCEDURE — 6370000000 HC RX 637 (ALT 250 FOR IP): Performed by: NURSE PRACTITIONER

## 2023-06-16 PROCEDURE — 51798 US URINE CAPACITY MEASURE: CPT

## 2023-06-16 PROCEDURE — 36415 COLL VENOUS BLD VENIPUNCTURE: CPT

## 2023-06-16 PROCEDURE — 2060000000 HC ICU INTERMEDIATE R&B

## 2023-06-16 PROCEDURE — 83605 ASSAY OF LACTIC ACID: CPT

## 2023-06-16 PROCEDURE — 85025 COMPLETE CBC W/AUTO DIFF WBC: CPT

## 2023-06-16 PROCEDURE — 94761 N-INVAS EAR/PLS OXIMETRY MLT: CPT

## 2023-06-16 PROCEDURE — 6370000000 HC RX 637 (ALT 250 FOR IP): Performed by: ANESTHESIOLOGY

## 2023-06-16 PROCEDURE — 85014 HEMATOCRIT: CPT

## 2023-06-16 PROCEDURE — 80048 BASIC METABOLIC PNL TOTAL CA: CPT

## 2023-06-16 PROCEDURE — C9113 INJ PANTOPRAZOLE SODIUM, VIA: HCPCS | Performed by: ANESTHESIOLOGY

## 2023-06-16 PROCEDURE — 6360000002 HC RX W HCPCS: Performed by: ANESTHESIOLOGY

## 2023-06-16 PROCEDURE — 85018 HEMOGLOBIN: CPT

## 2023-06-16 RX ORDER — MECOBALAMIN 5000 MCG
5 TABLET,DISINTEGRATING ORAL NIGHTLY PRN
Status: DISCONTINUED | OUTPATIENT
Start: 2023-06-16 | End: 2023-06-17

## 2023-06-16 RX ADMIN — CITALOPRAM HYDROBROMIDE 10 MG: 10 TABLET ORAL at 09:19

## 2023-06-16 RX ADMIN — MIRTAZAPINE 7.5 MG: 15 TABLET, FILM COATED ORAL at 20:47

## 2023-06-16 RX ADMIN — SODIUM CHLORIDE, PRESERVATIVE FREE 10 ML: 5 INJECTION INTRAVENOUS at 20:50

## 2023-06-16 RX ADMIN — Medication 5 MG: at 23:20

## 2023-06-16 RX ADMIN — ATORVASTATIN CALCIUM 80 MG: 80 TABLET, FILM COATED ORAL at 09:19

## 2023-06-16 RX ADMIN — PANTOPRAZOLE SODIUM 40 MG: 40 INJECTION, POWDER, FOR SOLUTION INTRAVENOUS at 06:23

## 2023-06-16 ASSESSMENT — PAIN SCALES - GENERAL
PAINLEVEL_OUTOF10: 0

## 2023-06-16 NOTE — PLAN OF CARE
Patient still has minimal bright red discharge from rectum. Kept NPO. Problem: Skin/Tissue Integrity  Goal: Absence of new skin breakdown  Description: 1. Monitor for areas of redness and/or skin breakdown  2. Assess vascular access sites hourly  3. Every 4-6 hours minimum:  Change oxygen saturation probe site  4. Every 4-6 hours:  If on nasal continuous positive airway pressure, respiratory therapy assess nares and determine need for appliance change or resting period. Outcome: Progressing  Note: Skin breakdown preventive measures applied. Kept skin clean and dry     Problem: ABCDS Injury Assessment  Goal: Absence of physical injury  Outcome: Progressing  Flowsheets (Taken 6/16/2023 0742)  Absence of Physical Injury: Implement safety measures based on patient assessment     Problem: Safety - Adult  Goal: Free from fall injury  Outcome: Progressing  Flowsheets (Taken 6/16/2023 0742)  Free From Fall Injury: Instruct family/caregiver on patient safety  Note: Pt is a Fall Risk. See [facility] Fall Risk Score. Pt bed in low position and side rails up. Call light and belongings in reach. Pt encouraged to call for assistance. Will continue with hourly rounds for PO intake, pain needs, toileting, and repositioning as needed. Fall precautions in place.         Problem: Chronic Conditions and Co-morbidities  Goal: Patient's chronic conditions and co-morbidity symptoms are monitored and maintained or improved  Outcome: Progressing  Flowsheets (Taken 6/16/2023 0742)  Care Plan - Patient's Chronic Conditions and Co-Morbidity Symptoms are Monitored and Maintained or Improved: Monitor and assess patient's chronic conditions and comorbid symptoms for stability, deterioration, or improvement

## 2023-06-16 NOTE — H&P
Historical Provider, MD   clopidogrel (PLAVIX) 75 MG tablet Take 1 tablet by mouth daily    Historical Provider, MD   mirtazapine (REMERON) 15 MG tablet Take 0.5 tablets by mouth nightly    Historical Provider, MD   ELIQUIS 2.5 MG TABS tablet Take 1 tablet by mouth 2 times daily 3/30/23   Historical Provider, MD   citalopram (CELEXA) 10 MG tablet Take 1 tablet by mouth daily 3/7/23   Historical Provider, MD   Calcium-Magnesium-Vitamin D (CITRACAL CALCIUM+D PO) Take 1 tablet by mouth daily    Historical Provider, MD       Physical Exam:    Physical Exam     General: NAD  Eyes: EOMI  ENT: neck supple  Cardiovascular: Regular rate. Respiratory: Clear to auscultation  Gastrointestinal: Soft, non tender  Genitourinary: no suprapubic tenderness  Musculoskeletal: No edema  Skin: warm, dry  Neuro: Alert. Psych: Mood appropriate. Past Medical History:   PMHx   Past Medical History:   Diagnosis Date    Cancer [facility])     Diverticulitis     Unspecified cerebral artery occlusion with cerebral infarction      PSHX:  has a past surgical history that includes Inguinal hernia repair (12/30/2011); joint replacement; skin biopsy; and hip surgery (Left, 5/12/2023). Allergies: No Known Allergies  Fam HX: noncontributory  Soc HX:   Social History     Socioeconomic History    Marital status:       Spouse name: None    Number of children: None    Years of education: None    Highest education level: None   Tobacco Use    Smoking status: Never    Smokeless tobacco: Never   Substance and Sexual Activity    Alcohol use: No    Drug use: No       Medications:   Medications:    Infusions:   PRN Meds:     Labs      CBC:   Recent Labs     06/15/23  1703   WBC 12.4*   HGB 11.6*   *     BMP:    Recent Labs     06/15/23  1703      CL 99   CO2 16*   BUN 17   CREATININE 1.1   GLUCOSE 288*     Hepatic:   Recent Labs     06/15/23  1703   AST 16   ALT 7*   BILITOT 0.5   ALKPHOS 247*     Lipids: No results found for: CHOL, HDL,

## 2023-06-16 NOTE — CONSULTS
Consultation Note    Patient Name: Ankur Izquierdo  : 1940  Age: 78 y.o. Admitting Physician: Navarro Cleveland MD   Date of Admission: 6/15/2023  4:35 PM   Primary Care Physician: Guido Spring is being seen at the request of Navarro Cleveland MD for RECTAL BLEEDING. History of Present Illness:    Patient has history of dementia. History obtained from chart and patient's son  As per patient's son, patient was home recently from rehab after recent hospitalization for femur fracture . He noted that patient had bright red blood per rectum after having some hard stool. She had 2 such episodes of bright red blood per rectum and had near syncopal episode for which she was brought to the ER. Patient was tachycardic when she was brought to the ER. She had a CT angiogram abdomen done which was negative. Hemoglobin has been stable at 11.4  No evidence of any rectal bleeding since admission      GI History:  Last colonoscopy in , scattered diverticulosis    Past Medical History:  Past Medical History:   Diagnosis Date    Cancer ([address] [facility])     Diverticulitis     Unspecified cerebral artery occlusion with cerebral infarction         Past Surgical History:  Past Surgical History:   Procedure Laterality Date    HIP SURGERY Left 2023    LEFT HIP GAMMA NAILING performed by Meghan Koo MD at [facility]  2011    HERNIA INGUINAL REPAIR LEFT WITH POSSIBLE SMALL BOWEL    JOINT REPLACEMENT      SKIN BIOPSY          Historical Medications:  Prior to Visit Medications    Medication Sig Taking?  Authorizing Provider   atorvastatin (LIPITOR) 80 MG tablet Take 1 tablet by mouth daily  Historical Provider, MD   clopidogrel (PLAVIX) 75 MG tablet Take 1 tablet by mouth daily  Historical Provider, MD   mirtazapine (REMERON) 15 MG tablet Take 0.5 tablets by mouth nightly  Historical Provider, MD   ELIQUIS 2.5 MG TABS tablet Take 1 tablet by mouth 2 times daily  Historical

## 2023-06-16 NOTE — ED NOTES
ED TO INPATIENT SBAR HANDOFF    Patient Name: Mayank Jeff   :  1940  78 y.o. MRN:  [id number]  Preferred Name  [address] [place] [address]  ED Room #:  N69/O35-28  Family/Caregiver Present no   Restraints no   Sitter no   Sepsis Risk Score Sepsis Risk Score: 1.52    Situation  Code Status: Prior No additional code details. Allergies: Patient has no known allergies. Weight: Patient Vitals for the past 96 hrs (Last 3 readings):   Weight   06/15/23 1641 113 lb 9 oz (51.5 kg)     Arrived from: home  Chief Complaint:   Chief Complaint   Patient presents with    Rectal Bleeding     Noticed today. denies abd pain      Hospital Problem/Diagnosis:  Principal Problem:    GI bleed  Resolved Problems:    * No resolved hospital problems. *    Imaging:   CTA ABDOMEN PELVIS W WO CONTRAST   Final Result      1. No acute intra-abdominopelvic abnormality. Specifically no active arterial extravasation.         Abnormal labs:   Abnormal Labs Reviewed   CBC WITH AUTO DIFFERENTIAL - Abnormal; Notable for the following components:       Result Value    WBC 12.4 (*)     Hemoglobin 11.6 (*)     RDW 17.3 (*)     Platelets 685 (*)     All other components within normal limits   COMPREHENSIVE METABOLIC PANEL W/ REFLEX TO MG FOR LOW K - Abnormal; Notable for the following components:    CO2 16 (*)     Anion Gap 27 (*)     Glucose 288 (*)     Est, Glom Filt Rate 49 (*)     Albumin 3.1 (*)     Albumin/Globulin Ratio 0.8 (*)     Alkaline Phosphatase 247 (*)     ALT 7 (*)     All other components within normal limits   ANTI-XA, UNFRACTIONATED HEPARIN - Abnormal; Notable for the following components:    Anti-XA Unfrac Heparin 0.23 (*)     All other components within normal limits   LACTIC ACID - Abnormal; Notable for the following components:    Lactic Acid 3.8 (*)     All other components within normal limits     Critical values: no     Abnormal Assessment Findings: melena    Background  History:   Past Medical History:   Diagnosis Date    Cancer ([address])

## 2023-06-17 LAB
HCT VFR BLD AUTO: 30.3 % (ref 36–48)
HGB BLD-MCNC: 9.6 G/DL (ref 12–16)

## 2023-06-17 PROCEDURE — 6370000000 HC RX 637 (ALT 250 FOR IP): Performed by: ANESTHESIOLOGY

## 2023-06-17 PROCEDURE — 2060000000 HC ICU INTERMEDIATE R&B

## 2023-06-17 PROCEDURE — 85014 HEMATOCRIT: CPT

## 2023-06-17 PROCEDURE — 6370000000 HC RX 637 (ALT 250 FOR IP): Performed by: INTERNAL MEDICINE

## 2023-06-17 PROCEDURE — 6360000002 HC RX W HCPCS: Performed by: ANESTHESIOLOGY

## 2023-06-17 PROCEDURE — 36415 COLL VENOUS BLD VENIPUNCTURE: CPT

## 2023-06-17 PROCEDURE — 2580000003 HC RX 258: Performed by: ANESTHESIOLOGY

## 2023-06-17 PROCEDURE — 85018 HEMOGLOBIN: CPT

## 2023-06-17 PROCEDURE — C9113 INJ PANTOPRAZOLE SODIUM, VIA: HCPCS | Performed by: ANESTHESIOLOGY

## 2023-06-17 RX ORDER — DOCUSATE SODIUM 100 MG/1
100 CAPSULE, LIQUID FILLED ORAL DAILY
Status: DISCONTINUED | OUTPATIENT
Start: 2023-06-17 | End: 2023-06-19 | Stop reason: HOSPADM

## 2023-06-17 RX ORDER — OLANZAPINE 5 MG/1
5 TABLET, ORALLY DISINTEGRATING ORAL NIGHTLY PRN
Status: DISCONTINUED | OUTPATIENT
Start: 2023-06-17 | End: 2023-06-19 | Stop reason: HOSPADM

## 2023-06-17 RX ORDER — MECOBALAMIN 5000 MCG
5 TABLET,DISINTEGRATING ORAL NIGHTLY
Status: DISCONTINUED | OUTPATIENT
Start: 2023-06-17 | End: 2023-06-19 | Stop reason: HOSPADM

## 2023-06-17 RX ORDER — HYDROCORTISONE ACETATE 25 MG/1
25 SUPPOSITORY RECTAL 2 TIMES DAILY
Status: DISCONTINUED | OUTPATIENT
Start: 2023-06-17 | End: 2023-06-19 | Stop reason: HOSPADM

## 2023-06-17 RX ADMIN — CITALOPRAM HYDROBROMIDE 10 MG: 10 TABLET ORAL at 08:12

## 2023-06-17 RX ADMIN — ATORVASTATIN CALCIUM 80 MG: 80 TABLET, FILM COATED ORAL at 08:12

## 2023-06-17 RX ADMIN — DOCUSATE SODIUM 100 MG: 100 CAPSULE, LIQUID FILLED ORAL at 10:30

## 2023-06-17 RX ADMIN — Medication 5 MG: at 22:14

## 2023-06-17 RX ADMIN — HYDROCORTISONE ACETATE 25 MG: 25 SUPPOSITORY RECTAL at 10:30

## 2023-06-17 RX ADMIN — SODIUM CHLORIDE, PRESERVATIVE FREE 10 ML: 5 INJECTION INTRAVENOUS at 22:11

## 2023-06-17 RX ADMIN — HYDROCORTISONE ACETATE 25 MG: 25 SUPPOSITORY RECTAL at 22:00

## 2023-06-17 RX ADMIN — SODIUM CHLORIDE, PRESERVATIVE FREE 10 ML: 5 INJECTION INTRAVENOUS at 08:13

## 2023-06-17 RX ADMIN — MIRTAZAPINE 7.5 MG: 15 TABLET, FILM COATED ORAL at 22:11

## 2023-06-17 RX ADMIN — PANTOPRAZOLE SODIUM 40 MG: 40 INJECTION, POWDER, FOR SOLUTION INTRAVENOUS at 08:12

## 2023-06-17 ASSESSMENT — PAIN SCALES - GENERAL
PAINLEVEL_OUTOF10: 0

## 2023-06-18 LAB
ANION GAP SERPL CALCULATED.3IONS-SCNC: 6 MMOL/L (ref 3–16)
BASOPHILS # BLD: 0.1 K/UL (ref 0–0.2)
BASOPHILS NFR BLD: 0.8 %
BUN SERPL-MCNC: 15 MG/DL (ref 7–20)
CALCIUM SERPL-MCNC: 8.6 MG/DL (ref 8.3–10.6)
CHLORIDE SERPL-SCNC: 103 MMOL/L (ref 99–110)
CO2 SERPL-SCNC: 31 MMOL/L (ref 21–32)
CREAT SERPL-MCNC: 0.6 MG/DL (ref 0.6–1.2)
DEPRECATED RDW RBC AUTO: 16.5 % (ref 12.4–15.4)
EOSINOPHIL # BLD: 0.7 K/UL (ref 0–0.6)
EOSINOPHIL NFR BLD: 8.4 %
GFR SERPLBLD CREATININE-BSD FMLA CKD-EPI: >60 ML/MIN/{1.73_M2}
GLUCOSE SERPL-MCNC: 123 MG/DL (ref 70–99)
HCT VFR BLD AUTO: 28.9 % (ref 36–48)
HGB BLD-MCNC: 9.5 G/DL (ref 12–16)
LYMPHOCYTES # BLD: 1.7 K/UL (ref 1–5.1)
LYMPHOCYTES NFR BLD: 19.6 %
MAGNESIUM SERPL-MCNC: 1.8 MG/DL (ref 1.8–2.4)
MCH RBC QN AUTO: 29.5 PG (ref 26–34)
MCHC RBC AUTO-ENTMCNC: 33 G/DL (ref 31–36)
MCV RBC AUTO: 89.3 FL (ref 80–100)
MONOCYTES # BLD: 0.8 K/UL (ref 0–1.3)
MONOCYTES NFR BLD: 9.1 %
NEUTROPHILS # BLD: 5.5 K/UL (ref 1.7–7.7)
NEUTROPHILS NFR BLD: 62.1 %
PLATELET # BLD AUTO: 681 K/UL (ref 135–450)
PMV BLD AUTO: 6.7 FL (ref 5–10.5)
POTASSIUM SERPL-SCNC: 3.5 MMOL/L (ref 3.5–5.1)
RBC # BLD AUTO: 3.24 M/UL (ref 4–5.2)
SODIUM SERPL-SCNC: 140 MMOL/L (ref 136–145)
WBC # BLD AUTO: 8.8 K/UL (ref 4–11)

## 2023-06-18 PROCEDURE — 85025 COMPLETE CBC W/AUTO DIFF WBC: CPT

## 2023-06-18 PROCEDURE — C9113 INJ PANTOPRAZOLE SODIUM, VIA: HCPCS | Performed by: ANESTHESIOLOGY

## 2023-06-18 PROCEDURE — 2500000003 HC RX 250 WO HCPCS: Performed by: INTERNAL MEDICINE

## 2023-06-18 PROCEDURE — 2580000003 HC RX 258: Performed by: ANESTHESIOLOGY

## 2023-06-18 PROCEDURE — 2060000000 HC ICU INTERMEDIATE R&B

## 2023-06-18 PROCEDURE — 83735 ASSAY OF MAGNESIUM: CPT

## 2023-06-18 PROCEDURE — 2580000003 HC RX 258: Performed by: INTERNAL MEDICINE

## 2023-06-18 PROCEDURE — 80048 BASIC METABOLIC PNL TOTAL CA: CPT

## 2023-06-18 PROCEDURE — 6370000000 HC RX 637 (ALT 250 FOR IP): Performed by: ANESTHESIOLOGY

## 2023-06-18 PROCEDURE — 6360000002 HC RX W HCPCS: Performed by: ANESTHESIOLOGY

## 2023-06-18 PROCEDURE — 36415 COLL VENOUS BLD VENIPUNCTURE: CPT

## 2023-06-18 PROCEDURE — 6360000002 HC RX W HCPCS: Performed by: NURSE PRACTITIONER

## 2023-06-18 PROCEDURE — 6370000000 HC RX 637 (ALT 250 FOR IP): Performed by: INTERNAL MEDICINE

## 2023-06-18 RX ORDER — PANTOPRAZOLE SODIUM 40 MG/1
40 TABLET, DELAYED RELEASE ORAL
Status: DISCONTINUED | OUTPATIENT
Start: 2023-06-19 | End: 2023-06-19 | Stop reason: HOSPADM

## 2023-06-18 RX ORDER — ZIPRASIDONE MESYLATE 20 MG/ML
10 INJECTION, POWDER, LYOPHILIZED, FOR SOLUTION INTRAMUSCULAR ONCE
Status: COMPLETED | OUTPATIENT
Start: 2023-06-19 | End: 2023-06-18

## 2023-06-18 RX ADMIN — ATORVASTATIN CALCIUM 80 MG: 80 TABLET, FILM COATED ORAL at 08:09

## 2023-06-18 RX ADMIN — SODIUM CHLORIDE, PRESERVATIVE FREE 10 ML: 5 INJECTION INTRAVENOUS at 08:09

## 2023-06-18 RX ADMIN — HYDROCORTISONE ACETATE 25 MG: 25 SUPPOSITORY RECTAL at 08:10

## 2023-06-18 RX ADMIN — Medication 5 MG: at 21:05

## 2023-06-18 RX ADMIN — MIRTAZAPINE 7.5 MG: 15 TABLET, FILM COATED ORAL at 21:05

## 2023-06-18 RX ADMIN — ZIPRASIDONE MESYLATE 10 MG: 20 INJECTION, POWDER, LYOPHILIZED, FOR SOLUTION INTRAMUSCULAR at 23:58

## 2023-06-18 RX ADMIN — WATER 5 MG: 1 INJECTION INTRAMUSCULAR; INTRAVENOUS; SUBCUTANEOUS at 17:24

## 2023-06-18 RX ADMIN — SODIUM CHLORIDE, PRESERVATIVE FREE 10 ML: 5 INJECTION INTRAVENOUS at 21:07

## 2023-06-18 RX ADMIN — PANTOPRAZOLE SODIUM 40 MG: 40 INJECTION, POWDER, FOR SOLUTION INTRAVENOUS at 08:09

## 2023-06-18 RX ADMIN — CITALOPRAM HYDROBROMIDE 10 MG: 10 TABLET ORAL at 08:09

## 2023-06-18 RX ADMIN — OLANZAPINE 5 MG: 5 TABLET, ORALLY DISINTEGRATING ORAL at 21:11

## 2023-06-18 RX ADMIN — HYDROCORTISONE ACETATE 25 MG: 25 SUPPOSITORY RECTAL at 21:07

## 2023-06-18 ASSESSMENT — PAIN SCALES - GENERAL
PAINLEVEL_OUTOF10: 0

## 2023-06-18 NOTE — PLAN OF CARE
Problem: Safety - Adult  Goal: Free from fall injury  Outcome: Progressing  Free From Fall Injury: Instruct family/caregiver on patient safety  Note: Fall precautions in place. Bed alarm is on and in lowest position. Rounding on pt Q2 hours. Will continue to monitor.        Problem: Discharge Planning  Goal: Discharge to home or other facility with appropriate resources  Discharge to home or other facility with appropriate resources:   Identify barriers to discharge with patient and caregiver   Arrange for needed discharge resources and transportation as appropriate   Identify discharge learning needs (meds, wound care, etc)     Problem: Pain  Goal: Verbalizes/displays adequate comfort level or baseline comfort level  Outcome: Progressing   Encourage patient to monitor pain and request assistance   Assess pain using appropriate pain scale   Administer analgesics based on type and severity of pain and evaluate response   Implement non-pharmacological measures as appropriate and evaluate response    Problem: Gastrointestinal - Adult  Goal: Maintains or returns to baseline bowel function  Outcome: Progressing   Assess bowel function   Administer ordered medications as needed     Problem: Chronic Conditions and Co-morbidities  Goal: Patient's chronic conditions and co-morbidity symptoms are monitored and maintained or improved  Outcome: Progressing   Monitor and assess patient's chronic conditions and comorbid symptoms for stability, deterioration, or improvement   Collaborate with multidisciplinary team to address chronic and comorbid conditions and prevent exacerbation or deterioration   Update acute care plan with appropriate goals if chronic or comorbid symptoms are exacerbated and prevent overall improvement and discharge

## 2023-06-18 NOTE — PLAN OF CARE
Problem: Discharge Planning  Goal: Discharge to home or other facility with appropriate resources  Outcome: Progressing  Flowsheets (Taken 6/18/2023 0106)  Discharge to home or other facility with appropriate resources:   Identify barriers to discharge with patient and caregiver   Arrange for needed discharge resources and transportation as appropriate   Identify discharge learning needs (meds, wound care, etc)     Problem: Safety - Adult  Goal: Free from fall injury  Outcome: Progressing  Flowsheets (Taken 6/18/2023 0106)  Free From Fall Injury: Instruct family/caregiver on patient safety  Note: Fall precautions in place. Bed alarm is on and in lowest position. Rounding on pt Q2 hours. Will continue to monitor.        Problem: Chronic Conditions and Co-morbidities  Goal: Patient's chronic conditions and co-morbidity symptoms are monitored and maintained or improved  Outcome: Progressing  Flowsheets (Taken 6/18/2023 0106)  Care Plan - Patient's Chronic Conditions and Co-Morbidity Symptoms are Monitored and Maintained or Improved:   Monitor and assess patient's chronic conditions and comorbid symptoms for stability, deterioration, or improvement   Collaborate with multidisciplinary team to address chronic and comorbid conditions and prevent exacerbation or deterioration   Update acute care plan with appropriate goals if chronic or comorbid symptoms are exacerbated and prevent overall improvement and discharge     Problem: Pain  Goal: Verbalizes/displays adequate comfort level or baseline comfort level  Outcome: Progressing  Flowsheets (Taken 6/18/2023 0106)  Verbalizes/displays adequate comfort level or baseline comfort level:   Encourage patient to monitor pain and request assistance   Assess pain using appropriate pain scale   Administer analgesics based on type and severity of pain and evaluate response   Implement non-pharmacological measures as appropriate and evaluate response     Problem: Gastrointestinal -

## 2023-06-19 VITALS
SYSTOLIC BLOOD PRESSURE: 172 MMHG | TEMPERATURE: 98.4 F | HEIGHT: 67 IN | OXYGEN SATURATION: 94 % | DIASTOLIC BLOOD PRESSURE: 90 MMHG | HEART RATE: 130 BPM | WEIGHT: 113.32 LBS | RESPIRATION RATE: 20 BRPM | BODY MASS INDEX: 17.79 KG/M2

## 2023-06-19 LAB
ANION GAP SERPL CALCULATED.3IONS-SCNC: 12 MMOL/L (ref 3–16)
BASOPHILS # BLD: 0.1 K/UL (ref 0–0.2)
BASOPHILS NFR BLD: 0.7 %
BUN SERPL-MCNC: 12 MG/DL (ref 7–20)
CALCIUM SERPL-MCNC: 8.7 MG/DL (ref 8.3–10.6)
CHLORIDE SERPL-SCNC: 103 MMOL/L (ref 99–110)
CO2 SERPL-SCNC: 27 MMOL/L (ref 21–32)
CREAT SERPL-MCNC: 0.6 MG/DL (ref 0.6–1.2)
DEPRECATED RDW RBC AUTO: 16.2 % (ref 12.4–15.4)
EOSINOPHIL # BLD: 0.2 K/UL (ref 0–0.6)
EOSINOPHIL NFR BLD: 1.9 %
GFR SERPLBLD CREATININE-BSD FMLA CKD-EPI: >60 ML/MIN/{1.73_M2}
GLUCOSE SERPL-MCNC: 177 MG/DL (ref 70–99)
HCT VFR BLD AUTO: 30.6 % (ref 36–48)
HGB BLD-MCNC: 10.1 G/DL (ref 12–16)
LYMPHOCYTES # BLD: 2 K/UL (ref 1–5.1)
LYMPHOCYTES NFR BLD: 15.9 %
MAGNESIUM SERPL-MCNC: 1.8 MG/DL (ref 1.8–2.4)
MCH RBC QN AUTO: 31.1 PG (ref 26–34)
MCHC RBC AUTO-ENTMCNC: 33 G/DL (ref 31–36)
MCV RBC AUTO: 94.2 FL (ref 80–100)
MONOCYTES # BLD: 1.4 K/UL (ref 0–1.3)
MONOCYTES NFR BLD: 11.6 %
NEUTROPHILS # BLD: 8.7 K/UL (ref 1.7–7.7)
NEUTROPHILS NFR BLD: 69.9 %
PLATELET # BLD AUTO: 837 K/UL (ref 135–450)
PMV BLD AUTO: 6.7 FL (ref 5–10.5)
POTASSIUM SERPL-SCNC: 3.5 MMOL/L (ref 3.5–5.1)
RBC # BLD AUTO: 3.25 M/UL (ref 4–5.2)
SODIUM SERPL-SCNC: 142 MMOL/L (ref 136–145)
WBC # BLD AUTO: 12.4 K/UL (ref 4–11)

## 2023-06-19 PROCEDURE — 85025 COMPLETE CBC W/AUTO DIFF WBC: CPT

## 2023-06-19 PROCEDURE — 80048 BASIC METABOLIC PNL TOTAL CA: CPT

## 2023-06-19 PROCEDURE — 83735 ASSAY OF MAGNESIUM: CPT

## 2023-06-19 PROCEDURE — 36415 COLL VENOUS BLD VENIPUNCTURE: CPT

## 2023-06-19 PROCEDURE — 6360000002 HC RX W HCPCS: Performed by: INTERNAL MEDICINE

## 2023-06-19 RX ORDER — PSEUDOEPHEDRINE HCL 30 MG
100 TABLET ORAL DAILY
Qty: 30 CAPSULE | Refills: 3 | Status: SHIPPED | OUTPATIENT
Start: 2023-06-19

## 2023-06-19 RX ORDER — LORAZEPAM 2 MG/ML
1 INJECTION INTRAMUSCULAR ONCE
Status: DISCONTINUED | OUTPATIENT
Start: 2023-06-19 | End: 2023-06-19

## 2023-06-19 RX ORDER — MECOBALAMIN 5000 MCG
5 TABLET,DISINTEGRATING ORAL NIGHTLY PRN
Qty: 30 TABLET | Refills: 0 | Status: SHIPPED | OUTPATIENT
Start: 2023-06-19

## 2023-06-19 RX ORDER — ZIPRASIDONE MESYLATE 20 MG/ML
10 INJECTION, POWDER, LYOPHILIZED, FOR SOLUTION INTRAMUSCULAR ONCE
Status: COMPLETED | OUTPATIENT
Start: 2023-06-19 | End: 2023-06-19

## 2023-06-19 RX ORDER — HYDROCORTISONE ACETATE 25 MG/1
25 SUPPOSITORY RECTAL 2 TIMES DAILY
Qty: 14 SUPPOSITORY | Refills: 0 | Status: SHIPPED | OUTPATIENT
Start: 2023-06-19 | End: 2023-06-26

## 2023-06-19 RX ADMIN — ZIPRASIDONE MESYLATE 10 MG: 20 INJECTION, POWDER, LYOPHILIZED, FOR SOLUTION INTRAMUSCULAR at 09:06

## 2023-06-19 NOTE — PLAN OF CARE
Problem: Skin/Tissue Integrity  Goal: Absence of new skin breakdown  Description: 1. Monitor for areas of redness and/or skin breakdown  2. Assess vascular access sites hourly  3. Every 4-6 hours minimum:  Change oxygen saturation probe site  4. Every 4-6 hours:  If on nasal continuous positive airway pressure, respiratory therapy assess nares and determine need for appliance change or resting period.   Outcome: Progressing     Problem: ABCDS Injury Assessment  Goal: Absence of physical injury  Outcome: Progressing  Flowsheets (Taken 6/19/2023 0122)  Absence of Physical Injury: Implement safety measures based on patient assessment     Problem: Discharge Planning  Goal: Discharge to home or other facility with appropriate resources  Outcome: Progressing  Flowsheets (Taken 6/19/2023 0122)  Discharge to home or other facility with appropriate resources:   Identify barriers to discharge with patient and caregiver   Arrange for needed discharge resources and transportation as appropriate   Identify discharge learning needs (meds, wound care, etc)   Arrange for interpreters to assist at discharge as needed   Refer to discharge planning if patient needs post-hospital services based on physician order or complex needs related to functional status, cognitive ability or social support system     Problem: Safety - Adult  Goal: Free from fall injury  Outcome: Progressing  Flowsheets (Taken 6/19/2023 0122)  Free From Fall Injury: Instruct family/caregiver on patient safety     Problem: Chronic Conditions and Co-morbidities  Goal: Patient's chronic conditions and co-morbidity symptoms are monitored and maintained or improved  Outcome: Progressing  Flowsheets (Taken 6/19/2023 0122)  Care Plan - Patient's Chronic Conditions and Co-Morbidity Symptoms are Monitored and Maintained or Improved:   Monitor and assess patient's chronic conditions and comorbid symptoms for stability, deterioration, or improvement   Collaborate with

## 2023-06-19 NOTE — PLAN OF CARE
Adequate for discharge to son Erna Marquez who is the responsible individual for her continued care at home. Aware she needs 24hr care/supervision and is a fall risk. Treatment team and son believes patient will do better in home environment around family members and is stable to discharge with home care and a walker.   Transferred to wheelchair with 1 assist to front exit with Erna Marquez

## 2023-06-19 NOTE — DISCHARGE SUMMARY
V2.0  Discharge Summary    Name:  Josh Escalera /Age/Sex: 1940 (78 y.o. female)   Admit Date: 6/15/2023  Discharge Date: 23    MRN & CSN:  [id number] & [id number] Encounter Date and Time 23 6:19 PM EDT    Attending:  No att. providers found Discharging Provider: Julia Tatum MD       Hospital Course:     Brief HPI: Josh Escalera is a 78 y.o. female who presented with bloody stools at home. Brief Problem Based Course:   Lower GI bleed: CT abdomen pelvis was negative for active extravasation. No other acute pathology. Her anticoagulants were stopped. Gastroenterology consulted, who discussed with family and decided against colonoscopy at this time. Bleeding stopped on its own, diet was advanced and there was no recurrence of bleeding for 72 hours prior to discharge. Patient with follow-up with gastroenterology as outpatient as needed if they would like colonoscopy. Anticoagulants were placed on hold on discharge, advised to follow-up with PCP and resume Eliquis and Plavix if no further bleeding observed as outpatient. Intermittent agitation: Patient had some sundowning in the hospital, however she was very comfortable in the presence of her family, discussed with son John Paul Friday who felt the patient would do well at home in the familiar environment. Patient discharged with home health and family support at home  Paroxysmal atrial fibrillation: Off Eliquis due to lower GI bleed, unknown source. Stopped on discharge, follow-up as outpatient and resume if no further bleeding. The patient expressed appropriate understanding of, and agreement with the discharge recommendations, medications, and plan.      Consults this admission:  IP CONSULT TO GI  IP CONSULT TO SOCIAL WORK  IP CONSULT TO HOME CARE NEEDS    Discharge Diagnosis:   GI bleed        Discharge Instruction:   Follow up appointments: GI as needed  Primary care physician: Burton Rice within 1 wk  Diet: regular diet   Activity:

## 2023-06-19 NOTE — CARE COORDINATION
CTN contacted Shereen with [facility] [phone]. They have accepted this patient and will pull referral from [facility].  They will contact patient and make arrangements for [facility] by 6/21  Electronically signed by Nelida Marcus LPN on 0/71/7550 at 90:10 AM
Patient from home. Just discharged from [address] [place] [address] Dr LYONS (stay there 5/17- 6/14) for post hip surgery rehab. Came to ED with bouts of rectal bleeding. Son is  for intake. Patient has some baseline dementia. CM will continue to follow patient until discharge.  Electronically signed by Ej Trevino RN on 6/16/2023 at 11:11 AM
and her family were provided with a choice of provider and agrees with the discharge plan. Freedom of choice list with basic dialogue that supports the patient's individualized plan of care/goals and shares the quality data associated with the providers was provided to:     Patient Representative Name:       The Patient and/or Patient Representative Agree with the Discharge Plan?       Charles Duran RN  Case Management Department  Ph: [phone]

## 2024-04-23 ENCOUNTER — APPOINTMENT (OUTPATIENT)
Dept: GENERAL RADIOLOGY | Age: 84
End: 2024-04-23
Payer: MEDICARE

## 2024-04-23 ENCOUNTER — APPOINTMENT (OUTPATIENT)
Dept: CT IMAGING | Age: 84
End: 2024-04-23
Payer: MEDICARE

## 2024-04-23 ENCOUNTER — HOSPITAL ENCOUNTER (INPATIENT)
Age: 84
LOS: 9 days | Discharge: HOME | End: 2024-05-02
Attending: EMERGENCY MEDICINE | Admitting: INTERNAL MEDICINE
Payer: MEDICARE

## 2024-04-23 DIAGNOSIS — M21.932 DEFORMITY OF LEFT WRIST: ICD-10-CM

## 2024-04-23 DIAGNOSIS — S52.92XA CLOSED FRACTURE OF DISTAL END OF LEFT FOREARM, INITIAL ENCOUNTER: ICD-10-CM
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
## 2024-04-23 PROBLEM — A41.9 SEPSIS (HCC): Status: ACTIVE | Noted: 2024-04-23

## 2024-04-23 LAB
ALBUMIN SERPL-MCNC: 4 G/DL (ref 3.4–5)
ALBUMIN/GLOB SERPL: 1.2 {RATIO} (ref 1.1–2.2)
ALP SERPL-CCNC: 141 U/L (ref 40–129)
ALT SERPL-CCNC: 10 U/L (ref 10–40)
ANION GAP SERPL CALCULATED.3IONS-SCNC: 15 MMOL/L (ref 3–16)
AST SERPL-CCNC: 25 U/L (ref 15–37)
BACTERIA URNS QL MICRO: ABNORMAL /HPF
BILIRUB SERPL-MCNC: 0.3 MG/DL (ref 0–1)
BILIRUB UR QL STRIP.AUTO: NEGATIVE
BUN SERPL-MCNC: 17 MG/DL (ref 7–20)
CALCIUM SERPL-MCNC: 9.4 MG/DL (ref 8.3–10.6)
CHLORIDE SERPL-SCNC: 101 MMOL/L (ref 99–110)
CK SERPL-CCNC: 134 U/L (ref 26–192)
CLARITY UR: CLEAR
CO2 SERPL-SCNC: 24 MMOL/L (ref 21–32)
COLOR UR: YELLOW
CREAT SERPL-MCNC: 1.3 MG/DL (ref 0.6–1.2)
DEPRECATED RDW RBC AUTO: 16 % (ref 12.4–15.4)
EKG ATRIAL RATE: 113 BPM
EKG DIAGNOSIS: NORMAL
EKG P AXIS: 59 DEGREES
EKG P-R INTERVAL: 172 MS
EKG Q-T INTERVAL: 384 MS
EKG QRS DURATION: 122 MS
EKG QTC CALCULATION (BAZETT): 517 MS
EKG R AXIS: 12 DEGREES
EKG T AXIS: 27 DEGREES
EKG VENTRICULAR RATE: 109 BPM
EPI CELLS #/AREA URNS HPF: ABNORMAL /HPF (ref 0–5)
ETHANOLAMINE SERPL-MCNC: NORMAL MG/DL (ref 0–0.08)
GFR SERPLBLD CREATININE-BSD FMLA CKD-EPI: 40 ML/MIN/{1.73_M2}
GLUCOSE SERPL-MCNC: 214 MG/DL (ref 70–99)
GLUCOSE UR STRIP.AUTO-MCNC: NEGATIVE MG/DL
HCT VFR BLD AUTO: 42.3 % (ref 36–48)
HGB BLD-MCNC: 13.8 G/DL (ref 12–16)
HGB UR QL STRIP.AUTO: NEGATIVE
KETONES UR STRIP.AUTO-MCNC: NEGATIVE MG/DL
LACTATE BLDV-SCNC: 2.6 MMOL/L (ref 0.4–1.9)
LACTATE BLDV-SCNC: 5.1 MMOL/L (ref 0.4–2)
LEUKOCYTE ESTERASE UR QL STRIP.AUTO: NEGATIVE
MCH RBC QN AUTO: 27.3 PG (ref 26–34)
MCHC RBC AUTO-ENTMCNC: 32.7 G/DL (ref 31–36)
MCV RBC AUTO: 83.5 FL (ref 80–100)
NITRITE UR QL STRIP.AUTO: NEGATIVE
NT-PROBNP SERPL-MCNC: 1451 PG/ML (ref 0–449)
PH UR STRIP.AUTO: 7 [PH] (ref 5–8)
PLATELET # BLD AUTO: 433 K/UL (ref 135–450)
PMV BLD AUTO: 7.1 FL (ref 5–10.5)
POTASSIUM SERPL-SCNC: 4.2 MMOL/L (ref 3.5–5.1)
PROT SERPL-MCNC: 7.3 G/DL (ref 6.4–8.2)
PROT UR STRIP.AUTO-MCNC: ABNORMAL MG/DL
RBC # BLD AUTO: 5.07 M/UL (ref 4–5.2)
RBC #/AREA URNS HPF: ABNORMAL /HPF (ref 0–4)
SODIUM SERPL-SCNC: 140 MMOL/L (ref 136–145)
SP GR UR STRIP.AUTO: 1.02 (ref 1–1.03)
TROPONIN, HIGH SENSITIVITY: 51 NG/L (ref 0–14)
TROPONIN, HIGH SENSITIVITY: 53 NG/L (ref 0–14)
UA COMPLETE W REFLEX CULTURE PNL UR: ABNORMAL
UA DIPSTICK W REFLEX MICRO PNL UR: YES
URN SPEC COLLECT METH UR: ABNORMAL
UROBILINOGEN UR STRIP-ACNC: 0.2 E.U./DL
WBC # BLD AUTO: 14.6 K/UL (ref 4–11)
WBC #/AREA URNS HPF: ABNORMAL /HPF (ref 0–5)

## 2024-04-23 PROCEDURE — 82077 ASSAY SPEC XCP UR&BREATH IA: CPT

## 2024-04-23 PROCEDURE — 6360000002 HC RX W HCPCS: Performed by: INTERNAL MEDICINE

## 2024-04-23 PROCEDURE — 73110 X-RAY EXAM OF WRIST: CPT

## 2024-04-23 PROCEDURE — 6360000002 HC RX W HCPCS: Performed by: STUDENT IN AN ORGANIZED HEALTH CARE EDUCATION/TRAINING PROGRAM

## 2024-04-23 PROCEDURE — 2580000003 HC RX 258: Performed by: INTERNAL MEDICINE

## 2024-04-23 PROCEDURE — 71045 X-RAY EXAM CHEST 1 VIEW: CPT

## 2024-04-23 PROCEDURE — 80053 COMPREHEN METABOLIC PANEL: CPT

## 2024-04-23 PROCEDURE — 99285 EMERGENCY DEPT VISIT HI MDM: CPT

## 2024-04-23 PROCEDURE — 96375 TX/PRO/DX INJ NEW DRUG ADDON: CPT

## 2024-04-23 PROCEDURE — 72170 X-RAY EXAM OF PELVIS: CPT

## 2024-04-23 PROCEDURE — 84484 ASSAY OF TROPONIN QUANT: CPT

## 2024-04-23 PROCEDURE — 0PSLXZZ REPOSITION LEFT ULNA, EXTERNAL APPROACH: ICD-10-PCS | Performed by: STUDENT IN AN ORGANIZED HEALTH CARE EDUCATION/TRAINING PROGRAM

## 2024-04-23 PROCEDURE — 0PSJXZZ REPOSITION LEFT RADIUS, EXTERNAL APPROACH: ICD-10-PCS | Performed by: STUDENT IN AN ORGANIZED HEALTH CARE EDUCATION/TRAINING PROGRAM

## 2024-04-23 PROCEDURE — 72125 CT NECK SPINE W/O DYE: CPT

## 2024-04-23 PROCEDURE — 73090 X-RAY EXAM OF FOREARM: CPT

## 2024-04-23 PROCEDURE — 6370000000 HC RX 637 (ALT 250 FOR IP): Performed by: INTERNAL MEDICINE

## 2024-04-23 PROCEDURE — 93005 ELECTROCARDIOGRAM TRACING: CPT | Performed by: STUDENT IN AN ORGANIZED HEALTH CARE EDUCATION/TRAINING PROGRAM

## 2024-04-23 PROCEDURE — 81001 URINALYSIS AUTO W/SCOPE: CPT

## 2024-04-23 PROCEDURE — 87040 BLOOD CULTURE FOR BACTERIA: CPT

## 2024-04-23 PROCEDURE — 96361 HYDRATE IV INFUSION ADD-ON: CPT

## 2024-04-23 PROCEDURE — 36415 COLL VENOUS BLD VENIPUNCTURE: CPT

## 2024-04-23 PROCEDURE — 73130 X-RAY EXAM OF HAND: CPT

## 2024-04-23 PROCEDURE — 85027 COMPLETE CBC AUTOMATED: CPT

## 2024-04-23 PROCEDURE — 96376 TX/PRO/DX INJ SAME DRUG ADON: CPT

## 2024-04-23 PROCEDURE — 83605 ASSAY OF LACTIC ACID: CPT

## 2024-04-23 PROCEDURE — 73030 X-RAY EXAM OF SHOULDER: CPT

## 2024-04-23 PROCEDURE — 82550 ASSAY OF CK (CPK): CPT

## 2024-04-23 PROCEDURE — 96374 THER/PROPH/DIAG INJ IV PUSH: CPT

## 2024-04-23 PROCEDURE — 1200000000 HC SEMI PRIVATE

## 2024-04-23 PROCEDURE — 70450 CT HEAD/BRAIN W/O DYE: CPT

## 2024-04-23 PROCEDURE — 2580000003 HC RX 258: Performed by: STUDENT IN AN ORGANIZED HEALTH CARE EDUCATION/TRAINING PROGRAM

## 2024-04-23 PROCEDURE — 83880 ASSAY OF NATRIURETIC PEPTIDE: CPT

## 2024-04-23 PROCEDURE — 2500000003 HC RX 250 WO HCPCS: Performed by: STUDENT IN AN ORGANIZED HEALTH CARE EDUCATION/TRAINING PROGRAM

## 2024-04-23 RX ORDER — ACETAMINOPHEN 650 MG/1
650 SUPPOSITORY RECTAL EVERY 6 HOURS PRN
Status: DISCONTINUED | OUTPATIENT
Start: 2024-04-23 | End: 2024-05-02 | Stop reason: HOSPADM

## 2024-04-23 RX ORDER — ONDANSETRON 2 MG/ML
4 INJECTION INTRAMUSCULAR; INTRAVENOUS EVERY 6 HOURS PRN
Status: DISCONTINUED | OUTPATIENT
Start: 2024-04-23 | End: 2024-05-02 | Stop reason: HOSPADM

## 2024-04-23 RX ORDER — ACETAMINOPHEN 325 MG/1
650 TABLET ORAL EVERY 6 HOURS PRN
Status: DISCONTINUED | OUTPATIENT
Start: 2024-04-23 | End: 2024-05-02 | Stop reason: HOSPADM

## 2024-04-23 RX ORDER — SODIUM CHLORIDE 9 MG/ML
INJECTION, SOLUTION INTRAVENOUS PRN
Status: DISCONTINUED | OUTPATIENT
Start: 2024-04-23 | End: 2024-05-02 | Stop reason: HOSPADM

## 2024-04-23 RX ORDER — MECOBALAMIN 5000 MCG
5 TABLET,DISINTEGRATING ORAL NIGHTLY PRN
Status: DISCONTINUED | OUTPATIENT
Start: 2024-04-23 | End: 2024-05-02 | Stop reason: HOSPADM

## 2024-04-23 RX ORDER — MIRTAZAPINE 15 MG/1
7.5 TABLET, FILM COATED ORAL NIGHTLY
Status: DISCONTINUED | OUTPATIENT
Start: 2024-04-23 | End: 2024-05-02 | Stop reason: HOSPADM

## 2024-04-23 RX ORDER — HEPARIN SODIUM 5000 [USP'U]/ML
5000 INJECTION, SOLUTION INTRAVENOUS; SUBCUTANEOUS 2 TIMES DAILY
Status: DISCONTINUED | OUTPATIENT
Start: 2024-04-23 | End: 2024-05-02 | Stop reason: HOSPADM

## 2024-04-23 RX ORDER — LIDOCAINE HYDROCHLORIDE 10 MG/ML
20 INJECTION, SOLUTION INFILTRATION; PERINEURAL ONCE
Status: COMPLETED | OUTPATIENT
Start: 2024-04-23 | End: 2024-04-23

## 2024-04-23 RX ORDER — CITALOPRAM HYDROBROMIDE 10 MG/1
10 TABLET ORAL DAILY
Status: DISCONTINUED | OUTPATIENT
Start: 2024-04-23 | End: 2024-05-02 | Stop reason: HOSPADM

## 2024-04-23 RX ORDER — MORPHINE SULFATE 2 MG/ML
2 INJECTION, SOLUTION INTRAMUSCULAR; INTRAVENOUS
Status: COMPLETED | OUTPATIENT
Start: 2024-04-23 | End: 2024-04-23

## 2024-04-23 RX ORDER — CEFEPIME 1 G/50ML
2000 INJECTION, SOLUTION INTRAVENOUS ONCE
Status: COMPLETED | OUTPATIENT
Start: 2024-04-23 | End: 2024-04-23

## 2024-04-23 RX ORDER — SODIUM CHLORIDE, SODIUM LACTATE, POTASSIUM CHLORIDE, AND CALCIUM CHLORIDE .6; .31; .03; .02 G/100ML; G/100ML; G/100ML; G/100ML
30 INJECTION, SOLUTION INTRAVENOUS ONCE
Status: COMPLETED | OUTPATIENT
Start: 2024-04-23 | End: 2024-04-23

## 2024-04-23 RX ORDER — ATORVASTATIN CALCIUM 80 MG/1
80 TABLET, FILM COATED ORAL DAILY
Status: DISCONTINUED | OUTPATIENT
Start: 2024-04-23 | End: 2024-05-02 | Stop reason: HOSPADM

## 2024-04-23 RX ORDER — POLYETHYLENE GLYCOL 3350 17 G/17G
17 POWDER, FOR SOLUTION ORAL DAILY PRN
Status: DISCONTINUED | OUTPATIENT
Start: 2024-04-23 | End: 2024-05-02 | Stop reason: HOSPADM

## 2024-04-23 RX ORDER — SODIUM CHLORIDE 0.9 % (FLUSH) 0.9 %
5-40 SYRINGE (ML) INJECTION PRN
Status: DISCONTINUED | OUTPATIENT
Start: 2024-04-23 | End: 2024-05-02 | Stop reason: HOSPADM

## 2024-04-23 RX ORDER — ONDANSETRON 4 MG/1
4 TABLET, ORALLY DISINTEGRATING ORAL EVERY 8 HOURS PRN
Status: DISCONTINUED | OUTPATIENT
Start: 2024-04-23 | End: 2024-05-02 | Stop reason: HOSPADM

## 2024-04-23 RX ORDER — SODIUM CHLORIDE 0.9 % (FLUSH) 0.9 %
5-40 SYRINGE (ML) INJECTION EVERY 12 HOURS SCHEDULED
Status: DISCONTINUED | OUTPATIENT
Start: 2024-04-23 | End: 2024-05-02 | Stop reason: HOSPADM

## 2024-04-23 RX ORDER — SODIUM CHLORIDE 9 MG/ML
INJECTION, SOLUTION INTRAVENOUS CONTINUOUS
Status: ACTIVE | OUTPATIENT
Start: 2024-04-23 | End: 2024-04-25

## 2024-04-23 RX ADMIN — SODIUM CHLORIDE: 9 INJECTION, SOLUTION INTRAVENOUS at 22:02

## 2024-04-23 RX ADMIN — HEPARIN SODIUM 5000 UNITS: 5000 INJECTION INTRAVENOUS; SUBCUTANEOUS at 22:04

## 2024-04-23 RX ADMIN — SODIUM CHLORIDE: 9 INJECTION, SOLUTION INTRAVENOUS at 20:54

## 2024-04-23 RX ADMIN — ACETAMINOPHEN 650 MG: 325 TABLET ORAL at 16:52

## 2024-04-23 RX ADMIN — SODIUM CHLORIDE, PRESERVATIVE FREE 10 ML: 5 INJECTION INTRAVENOUS at 09:14

## 2024-04-23 RX ADMIN — CEFEPIME 1000 MG: 1 INJECTION, POWDER, FOR SOLUTION INTRAMUSCULAR; INTRAVENOUS at 22:03

## 2024-04-23 RX ADMIN — WATER 1000 MG: 1 INJECTION INTRAMUSCULAR; INTRAVENOUS; SUBCUTANEOUS at 03:31

## 2024-04-23 RX ADMIN — MIRTAZAPINE 7.5 MG: 15 TABLET, FILM COATED ORAL at 20:53

## 2024-04-23 RX ADMIN — CEFEPIME 2000 MG: 1 INJECTION, SOLUTION INTRAVENOUS at 09:09

## 2024-04-23 RX ADMIN — MORPHINE SULFATE 2 MG: 2 INJECTION, SOLUTION INTRAMUSCULAR; INTRAVENOUS at 01:50

## 2024-04-23 RX ADMIN — LIDOCAINE HYDROCHLORIDE 20 ML: 10 INJECTION, SOLUTION INFILTRATION; PERINEURAL at 03:32

## 2024-04-23 RX ADMIN — SODIUM CHLORIDE: 9 INJECTION, SOLUTION INTRAVENOUS at 09:20

## 2024-04-23 RX ADMIN — Medication 5 MG: at 20:53

## 2024-04-23 RX ADMIN — SODIUM CHLORIDE 1250 MG: 9 INJECTION, SOLUTION INTRAVENOUS at 09:28

## 2024-04-23 RX ADMIN — ACETAMINOPHEN 650 MG: 325 TABLET ORAL at 20:52

## 2024-04-23 RX ADMIN — MORPHINE SULFATE 2 MG: 2 INJECTION, SOLUTION INTRAMUSCULAR; INTRAVENOUS at 03:31

## 2024-04-23 RX ADMIN — SODIUM CHLORIDE, POTASSIUM CHLORIDE, SODIUM LACTATE AND CALCIUM CHLORIDE 1464 ML: 600; 310; 30; 20 INJECTION, SOLUTION INTRAVENOUS at 03:44

## 2024-04-23 RX ADMIN — SODIUM CHLORIDE: 9 INJECTION, SOLUTION INTRAVENOUS at 09:06

## 2024-04-23 ASSESSMENT — PAIN SCALES - GENERAL
PAINLEVEL_OUTOF10: 0
PAINLEVEL_OUTOF10: 5
PAINLEVEL_OUTOF10: 0
PAINLEVEL_OUTOF10: 0
PAINLEVEL_OUTOF10: 10
PAINLEVEL_OUTOF10: 0
PAINLEVEL_OUTOF10: 4
PAINLEVEL_OUTOF10: 0
PAINLEVEL_OUTOF10: 4
PAINLEVEL_OUTOF10: 0
PAINLEVEL_OUTOF10: 0
PAINLEVEL_OUTOF10: 3
PAINLEVEL_OUTOF10: 0
PAINLEVEL_OUTOF10: 0

## 2024-04-23 ASSESSMENT — PAIN DESCRIPTION - ONSET: ONSET: ON-GOING

## 2024-04-23 ASSESSMENT — PAIN SCALES - PAIN ASSESSMENT IN ADVANCED DEMENTIA (PAINAD)
FACIALEXPRESSION: SAD, FRIGHTENED, FROWN
CONSOLABILITY: DISTRACTED OR REASSURED BY VOICE/TOUCH
TOTALSCORE: 5
BODYLANGUAGE: RELAXED
BREATHING: NOISY LABORED BREATHING, LONG PERIODS HYPERVENTILATION, CHEYNE-STOKES RESPIRATIONS
NEGVOCALIZATION: OCCASIONAL MOAN/GROAN, LOW SPEECH, NEGATIVE/DISAPPROVING QUALITY

## 2024-04-23 ASSESSMENT — PAIN DESCRIPTION - ORIENTATION
ORIENTATION: LEFT;RIGHT
ORIENTATION: LEFT

## 2024-04-23 ASSESSMENT — PAIN DESCRIPTION - DESCRIPTORS
DESCRIPTORS: PATIENT UNABLE TO DESCRIBE
DESCRIPTORS: ACHING

## 2024-04-23 ASSESSMENT — PAIN - FUNCTIONAL ASSESSMENT
PAIN_FUNCTIONAL_ASSESSMENT: PREVENTS OR INTERFERES SOME ACTIVE ACTIVITIES AND ADLS
PAIN_FUNCTIONAL_ASSESSMENT: 0-10
PAIN_FUNCTIONAL_ASSESSMENT: PREVENTS OR INTERFERES SOME ACTIVE ACTIVITIES AND ADLS

## 2024-04-23 ASSESSMENT — PAIN DESCRIPTION - FREQUENCY: FREQUENCY: CONTINUOUS

## 2024-04-23 ASSESSMENT — LIFESTYLE VARIABLES
HOW OFTEN DO YOU HAVE A DRINK CONTAINING ALCOHOL: NEVER
HOW MANY STANDARD DRINKS CONTAINING ALCOHOL DO YOU HAVE ON A TYPICAL DAY: PATIENT DOES NOT DRINK

## 2024-04-23 ASSESSMENT — PAIN DESCRIPTION - LOCATION
LOCATION: WRIST
LOCATION: WRIST
LOCATION: KNEE;ARM
LOCATION: ARM;WRIST

## 2024-04-23 ASSESSMENT — PAIN DESCRIPTION - PAIN TYPE: TYPE: ACUTE PAIN

## 2024-04-23 NOTE — CARE COORDINATION
CM  following for  d/c planning:    Patient  from home alone pre chart  review  :  Patient admitted  with a fall and Sepsis:  getting  IV atbx :  Ortho surg  consulted  ( rib Fx and  wrist Fx. )      Attempted to meet with pt  , Ortho MD at  bedside assessing  and  placing splint  , patient  calling  out in  extreme pain.     CM  will follow up at  later  time  .    Per chart  pt lives at home alone , and son  monitors her  with cameras.   -  PTOT  evals  ordered  and  pending .     CM will follow up later,   time permitting.  To discuss  d/c planning  disposition  and  needs at D/C.   Electronically signed by Maddie Vicente RN on 4/23/2024 at 4:57 PM       Maddie Vicente RN Case Manager  [facility]  [address]  [place] [id number] [phone]  Fax [phone]

## 2024-04-23 NOTE — CONSULTS
Attending : MD Mason   Consult Note        Reason for Consult:  fall, left wrist pain/injury  Requesting Physician: Sean Figueroa MD  Date of Service: 4/23/2024 2:33 PM    CHIEF COMPLAINT:  As Above    History Obtained From:  patient, family member - son    HISTORY OF PRESENT ILLNESS:                The patient is a 84 y.o. female who presents with above chief complaint.  Patient was reportedly found down at her home overnight.  Unknown details of suspected fall  The patient lives alone and details provided by her son states that the patient has been having some functional decline recently.  He has a camera that he uses to help to monitor her  The patient does have a history of known dementia and unable to provide any significant history herself  Images in the emergency department and workup included left wrist demonstrating a displaced distal radius and distal ulna fractures.  Hematoma block and closed reduction was performed with improved alignment radiographically.  No antecedent injury to the left wrist reported by her son.  She did have a history of a fall and a left hip fracture treated with i cephalomedullary nail fixation last year.  Currently patient is admitted for possible sepsis and workup has included positive UA for UTI    Past Medical History:        Diagnosis Date    Cancer (HCC)     Diverticulitis     Unspecified cerebral artery occlusion with cerebral infarction      Past Surgical History:        Procedure Laterality Date    HIP SURGERY Left 5/12/2023    LEFT HIP GAMMA NAILING performed by Phillip Gonzalez MD at [facility] OR    INGUINAL HERNIA REPAIR  12/30/2011    HERNIA INGUINAL REPAIR LEFT WITH POSSIBLE SMALL BOWEL    JOINT REPLACEMENT      SKIN BIOPSY           Medications Prior to Admission:   Prior to Admission medications    Medication Sig Start Date End Date Taking? Authorizing Provider   apixaban (ELIQUIS) 2.5 MG TABS tablet Take 1 tablet by mouth 2 times daily   Yes Provider, MD Tim  with unknown details and history of left hip fracture    MD Hernandez Mtz MD

## 2024-04-23 NOTE — PLAN OF CARE
Ortho Consult    Note from ED reviewed by me. Consult placed via [facility] regarding left distal radius and ulna fractures after fall. I reviewed xrays demonstrating left wrist fracture   Plan sugar tong splint for now and no weight bearing to LUE.   Full ortho consult note to follow

## 2024-04-23 NOTE — PROGRESS NOTES
Pt transported to [facility] via stretcher. VSS. Belonging in bag, with rings inside bag transported with patient.

## 2024-04-23 NOTE — H&P
Hospital Medicine History & Physical      Date of Admission: 4/23/2024    Date of Service:  Pt seen/examined on 04/23/24     [x]Admitted to Inpatient with expected LOS greater than two midnights due to medical therapy.  []Placed in Observation status.    Chief Admission Complaint: Fall    Presenting Admission History:      84 y.o. female with PMHx significant for dementia who presented to ED with the complaint of fall.  Patient has history of dementia.  Lives at home by herself however it has been reported that the patient is being monitored by camera.  Patient was found outside of her house in the yard laying on the ground.  It appeared that the patient had suffered a fall.  Patient was brought to the ED for further evaluation.  Patient was found to have left distal ulna and radius fracture.  Also she was found to have left third and fourth rib fractures.  Patient underwent closed reduction of the left wrist fracture with application of the splint.  Labs were remarkable for elevated lactate of 5.0 which trended down to 2.8.  UA is still pending.  Patient was given IV Rocephin empirically by the ED provider.  She is not capable of providing any meaningful history due to underlying dementia.  Patient is currently being admitted under inpatient status for further management and evaluation.  .      ROS:     Review of 10 systems is negative except what is outlined in HPI.     Assessment:  Status post mechanical fall, unwitnessed.  Suspected sepsis without clear source.  Left distal ulna and radius fractures.  Left third and fourth rib fractures.  Acute kidney injury.  NSTEMI, likely type II.  Hyperlipidemia.  Dementia without behavioral disturbance.      Plan:    Patient is admitted under inpatient status.  Empirically start IV cefepime and vancomycin.  Follow-up blood culture.  Follow-up urine analysis and urine culture.  Continue IV fluids.  Monitor renal function.  Pain control with as needed Tylenol, try to avoid

## 2024-04-23 NOTE — ED PROVIDER NOTES
[facility]  EMERGENCY DEPARTMENT ENCOUNTER          EM RESIDENT NOTE       Date of evaluation: 4/23/2024    Chief Complaint     Fall and Wrist Injury (Patient found down outside by neighbors, unsure of time down. Patient sustained left wrist injury. Hx of dementia)      History of Present Illness     Jennifer Lorenzana is a 84 y.o. female who presents after being found down in her front yard by neighbors. The patient has dementia and lives alone, tells me that she does not remember what happened. EMS reports she was saying \"basement\" repeatedly when they got there and was able to ambulate to the cot unassisted. She is endorsing pain in her left wrist. By chart review the patient takes eliquis for paroxysmal afib.    MEDICAL DECISION MAKING / ASSESSMENT / PLAN     INITIAL VITALS: BP: 138/77, Temp: 98.2 °F (36.8 °C), Pulse: 71, Respirations: 20, SpO2: 95 %    Jennifer Lorenzana is a 84 y.o. female presenting after unwitnessed fall, found down, with a wrist deformity. VS unremarkable and primary survey was intact. Given the absence of any historical information regarding the circumstances of this patient's presentation other than what was observed at by EMS will proceed with trauma workup to include head & c-spine despite any visible injury. Broad lab workup including CK, ethanol, urine to evaluate for precipitating factors. Will continue to attempt to get in touch with patient's son Michael.     Is this patient to be included in the SEP-1 core measure? Yes SEP-1 CORE MEASURE DATA      Sepsis Criteria   Severe Sepsis Criteria   Septic Shock Criteria       Must meet 2:    []Temp >100.9 F (38.3 C) or < 96.8 F (36 C)  []HR > 90  []RR > 20  []WBC > 12 or < 4 or 10% bands    AND:    [] Infection Confirmed or Suspected.     Must meet 1:    [x]Lactate > 2       or   [x]Signs of Organ Dysfunction:    - SBP < 90 or MAP < 65  -Creatinine > 2 or increased from baseline  -Urine Output < 0.5 ml/kg/hr  -Bilirubin > 2  -INR > 1.5 (not

## 2024-04-23 NOTE — ED NOTES
ED TO INPATIENT SBAR HANDOFF    Patient Name: Jennifer Lorenzana   :  1940  84 y.o.   MRN:  [id number]  Preferred Name    ED Room #:  A06/A06-06  Family/Caregiver Present no   Restraints no   Sitter no   Sepsis Risk Score Sepsis Risk Score: 1.84    Situation  Code Status: Prior No additional code details.    Allergies: Patient has no known allergies.  Weight: Patient Vitals for the past 96 hrs (Last 3 readings):   Weight   24 0138 48.8 kg (107 lb 9.6 oz)     Arrived from: home  Chief Complaint:   Chief Complaint   Patient presents with    Fall    Wrist Injury     Patient found down outside by neighbors, unsure of time down. Patient sustained left wrist injury. Hx of dementia     Hospital Problem/Diagnosis:  Principal Problem:    Sepsis (HCC)  Resolved Problems:    * No resolved hospital problems. *    Imaging:   XR WRIST LEFT (MIN 3 VIEWS)   Final Result      Improved alignment of the distal radial distal ulnar fractures, displacement remains      Electronically signed by MD Bari Trevino      CT CERVICAL SPINE WO CONTRAST   Final Result      Diffuse spondylosis as described above without evidence for acute osseous process.      Electronically signed by MD Bari Trevino      CT HEAD WO CONTRAST   Final Result      1. No convincing evidence for acute intracranial process. No bleed or shift   2. Diffuse cerebral atrophy with remote infarct left frontal lobe   3. Marked periventricular chronic leukoencephalopathy      Electronically signed by MD Bari Trevino      XR CHEST PORTABLE   Final Result   1. Acute angulation of the left third and fourth ribs which may related to nondisplaced fractures   2. Cardiomegaly with mild pulmonary vascular congestion      Electronically signed by MD Bari Trevino      XR RADIUS ULNA LEFT (2 VIEWS)   Final Result   Markedly displaced distal radial distal ulnar fractures.      Electronically signed by MD Bari Trevino      XR HAND LEFT (MIN 3 VIEWS)   Final Result   1. Markedly displaced

## 2024-04-23 NOTE — ED PROVIDER NOTES
ED Attending Attestation Note     Date of evaluation: 4/23/2024    This patient was seen by the resident.  I have seen and examined the patient, agree with the workup, evaluation, management and diagnosis. The care plan has been discussed.  I have reviewed the ECG and concur with the resident's interpretation.  My assessment reveals adult female who presents after being found on the ground outside for an unknown period of time by neighbors.  She has a history of dementia cannot provide any history.  She has some tenderness in the left chest with 2 apparent rib fractures noted on chest x-ray, and displaced left both bone forearm fracture which was reduced in the emergency department; I supervised the reduction performed by the resident was present for the entire procedure.  We then placed patient in a sugar-tong splint.  She was also noted to initially have an elevated lactate and leukocytosis with concern for sepsis as a result, and subsequently was given Rocephin.  She'll be admitted for further tx     Joe Burr MD  04/23/24 2152

## 2024-04-23 NOTE — PROGRESS NOTES
4 Eyes Skin Assessment     NAME:  Jennifer Lorenzana  YOB: 1940  MEDICAL RECORD NUMBER:  [id number]    The patient is being assessed for  Admission    I agree that at least one RN has performed a thorough Head to Toe Skin Assessment on the patient. ALL assessment sites listed below have been assessed.      Areas assessed by both nurses:    Head, Face, Ears, Shoulders, Back, Chest, Arms, Elbows, Hands, Sacrum. Buttock, Coccyx, Ischium, Legs. Feet and Heels, and Under Medical Devices         Does the Patient have a Wound? No noted wound(s) Abrasions on BLE (knees)       Simone Prevention initiated by RN: Yes  Wound Care Orders initiated by RN: Yes    Pressure Injury (Stage 3,4, Unstageable, DTI, NWPT, and Complex wounds) if present, place Wound referral order by RN under : No    New Ostomies, if present place, Ostomy referral order under : No     Nurse 1 eSignature: Electronically signed by Nasra Starr RN on 4/23/24 at 8:10 AM EDT    **SHARE this note so that the co-signing nurse can place an eSignature**    Nurse 2 eSignature: Electronically signed by Zo Ferrer LPN on 4/23/24 at 10:08 AM EDT

## 2024-04-23 NOTE — PLAN OF CARE
Problem: Safety - Adult  Goal: Free from fall injury  Outcome: Progressing  Flowsheets (Taken 4/23/2024 1156)  Free From Fall Injury: Instruct family/caregiver on patient safety  Note: Pt provided non skid footwear and oriented to room and call light. Bed alarm on with bed in low position and wheels locked. Room door open and patient has bedside table and call light within reach.      Problem: ABCDS Injury Assessment  Goal: Absence of physical injury  Outcome: Progressing     Problem: Skin/Tissue Integrity  Goal: Absence of new skin breakdown  Description: 1.  Monitor for areas of redness and/or skin breakdown  2.  Assess vascular access sites hourly  3.  Every 4-6 hours minimum:  Change oxygen saturation probe site  4.  Every 4-6 hours:  If on nasal continuous positive airway pressure, respiratory therapy assess nares and determine need for appliance change or resting period.  Outcome: Progressing     Problem: Discharge Planning  Goal: Discharge to home or other facility with appropriate resources  Outcome: Progressing     Problem: Chronic Conditions and Co-morbidities  Goal: Patient's chronic conditions and co-morbidity symptoms are monitored and maintained or improved  Outcome: Progressing

## 2024-04-23 NOTE — CONSULTS
[facility] -  Clinical Pharmacy Note    Vancomycin - Management by Pharmacy    Consult Date(s): 4/23/24  Consulting Provider(s): Dr. Mitchell    Assessment / Plan  1)  Sepsis of unclear etiology - Vancomycin  Concurrent Antimicrobials: Cefepime  Day of Vanc Therapy / Ordered Duration: 1 of 7  Current Dosing Method: Bayesian-Guided AUC Dosing  Therapeutic Goal: -600 mg/L*hr  Current Dose / Plan:   Pt with slight NATHALIE - baseline SCr appears to be ~0.6.  SCr on presentation 1.3.  Will give loading dose of 1250mg IV x1 this AM.  Will then continue with 750mg IV q24h.  Regimen predicts an AUC = 590 with trough = 18.  Dose is on aggressive side, but expect improvement in SCr with fluids & treatment of sepsis.    BMP ordered to further assess regimen in AM tomorrow.  Will continue to monitor clinical condition and make adjustments to regimen as appropriate.    Please call with questions--  Thanks--  Sabine Copeland, PharmD, BCPS, BCGP  [id number] ([facility])   4/23/2024 8:14 AM      Subjective/Objective:   Jennifer Lorenzana is a 84 y.o. female with a PMHx significant for dementia, Rt MCA CVA s/p thrombectomy (2020), aortic stenosis, IBS, and osteoporosis who is admitted with after a fall at home.  Found to have left distal ulna and radius fracture, left 3rd / 4th rib fractures, NATHALIE, and suspected sepsis without clear source.     Pharmacy is consulted to dose Vancomycin.    Ht Readings from Last 1 Encounters:   04/23/24 1.651 m (5' 5\")     Wt Readings from Last 1 Encounters:   04/23/24 48.8 kg (107 lb 9.6 oz)     Current & Prior Antimicrobial Regimen(s):  Ceftriaxone x1 in ED 4/23  Cefepime (4/23-current)  Vancomycin - Pharmacy to dose  1250mg IV x1 4/23 09:00  750mg IV q24h (to start 4/24)    Vancomycin Level(s) / Doses:    Date Time Dose Type of Level / Level Interpretation                 Note: Serum levels collected for AUC-based dosing may be high if collected in close proximity to the dose administered. This

## 2024-04-23 NOTE — PROGRESS NOTES
Clinical Pharmacy Progress Note  Medication History     List of of current medications patient is taking is complete. Home Medication list in EPIC updated to reflect changes noted below.    Source of information: PCP office notes, [facility] pharmacy    Patient's home pharmacy: [facility] ([phone])     Changes made to medication list:   Medications removed:   Atorvastatin - not filled since June 2023  Docusate - old Rx from June 2023  Melatonin - old Rx from June 2023    Medications added:   Apixaban 2.5mg po BID    Medication doses adjusted / updated:   None    Other notes:  Clopidogrel - there is a somewhat recent fill for this med (12/13/23 x90 d) - confirmed via PCP office notes that pt is no longer to be taking Clopidogrel while on Apixaban.    Please call with questions--  Thanks--  Sabine Copeland, PharmD, BCPS, [facility]  [id number] (\[facility]\"")   4/23/2024 8:26 AM      Current Outpatient Medications   Medication Instructions    apixaban (ELIQUIS) 2.5 mg, Oral, 2 TIMES DAILY    Calcium-Magnesium-Vitamin D (CITRACAL CALCIUM+D PO) 1 tablet, Oral, DAILY,      citalopram (CELEXA) 10 MG tablet Take 1 tablet by mouth daily    mirtazapine (REMERON) 7.5 mg, Oral, NIGHTLY

## 2024-04-23 NOTE — PROGRESS NOTES
Pt arrived on unit by stretcher. Pt oriented to call light in room. Bedside table and call light within reach. Vitals stable. Pt confused but easily reoriented. Pt unable to recall any home medications.

## 2024-04-24 LAB
ANION GAP SERPL CALCULATED.3IONS-SCNC: 9 MMOL/L (ref 3–16)
BUN SERPL-MCNC: 12 MG/DL (ref 7–20)
CALCIUM SERPL-MCNC: 8.1 MG/DL (ref 8.3–10.6)
CHLORIDE SERPL-SCNC: 105 MMOL/L (ref 99–110)
CO2 SERPL-SCNC: 23 MMOL/L (ref 21–32)
CREAT SERPL-MCNC: 0.7 MG/DL (ref 0.6–1.2)
GFR SERPLBLD CREATININE-BSD FMLA CKD-EPI: 84 ML/MIN/{1.73_M2}
GLUCOSE SERPL-MCNC: 106 MG/DL (ref 70–99)
POTASSIUM SERPL-SCNC: 4 MMOL/L (ref 3.5–5.1)
PROCALCITONIN SERPL IA-MCNC: 0.16 NG/ML (ref 0–0.15)
SODIUM SERPL-SCNC: 137 MMOL/L (ref 136–145)

## 2024-04-24 PROCEDURE — 97530 THERAPEUTIC ACTIVITIES: CPT

## 2024-04-24 PROCEDURE — 36415 COLL VENOUS BLD VENIPUNCTURE: CPT

## 2024-04-24 PROCEDURE — 6370000000 HC RX 637 (ALT 250 FOR IP): Performed by: SURGERY

## 2024-04-24 PROCEDURE — 97116 GAIT TRAINING THERAPY: CPT

## 2024-04-24 PROCEDURE — 97535 SELF CARE MNGMENT TRAINING: CPT

## 2024-04-24 PROCEDURE — 84145 PROCALCITONIN (PCT): CPT

## 2024-04-24 PROCEDURE — 80048 BASIC METABOLIC PNL TOTAL CA: CPT

## 2024-04-24 PROCEDURE — 97163 PT EVAL HIGH COMPLEX 45 MIN: CPT

## 2024-04-24 PROCEDURE — 6360000002 HC RX W HCPCS: Performed by: INTERNAL MEDICINE

## 2024-04-24 PROCEDURE — 97166 OT EVAL MOD COMPLEX 45 MIN: CPT

## 2024-04-24 PROCEDURE — 2580000003 HC RX 258: Performed by: INTERNAL MEDICINE

## 2024-04-24 PROCEDURE — 1200000000 HC SEMI PRIVATE

## 2024-04-24 PROCEDURE — 6370000000 HC RX 637 (ALT 250 FOR IP): Performed by: INTERNAL MEDICINE

## 2024-04-24 PROCEDURE — 6360000002 HC RX W HCPCS: Performed by: SURGERY

## 2024-04-24 RX ORDER — HALOPERIDOL 5 MG/ML
2 INJECTION INTRAMUSCULAR ONCE
Status: COMPLETED | OUTPATIENT
Start: 2024-04-24 | End: 2024-04-24

## 2024-04-24 RX ORDER — ACETAMINOPHEN 160 MG/5ML
650 LIQUID ORAL EVERY 6 HOURS PRN
Status: DISCONTINUED | OUTPATIENT
Start: 2024-04-24 | End: 2024-05-02 | Stop reason: HOSPADM

## 2024-04-24 RX ORDER — CEFEPIME 1 G/50ML
2000 INJECTION, SOLUTION INTRAVENOUS EVERY 12 HOURS
Status: DISCONTINUED | OUTPATIENT
Start: 2024-04-24 | End: 2024-04-24

## 2024-04-24 RX ORDER — QUETIAPINE FUMARATE 25 MG/1
25 TABLET, FILM COATED ORAL NIGHTLY
Status: DISCONTINUED | OUTPATIENT
Start: 2024-04-24 | End: 2024-05-02 | Stop reason: HOSPADM

## 2024-04-24 RX ADMIN — ACETAMINOPHEN 650 MG: 325 TABLET ORAL at 05:48

## 2024-04-24 RX ADMIN — HALOPERIDOL LACTATE 2 MG: 5 INJECTION, SOLUTION INTRAMUSCULAR at 18:12

## 2024-04-24 RX ADMIN — CITALOPRAM HYDROBROMIDE 10 MG: 10 TABLET ORAL at 09:12

## 2024-04-24 RX ADMIN — ACETAMINOPHEN 650 MG: 325 TABLET ORAL at 20:03

## 2024-04-24 RX ADMIN — ACETAMINOPHEN 650 MG: 650 SOLUTION ORAL at 11:49

## 2024-04-24 RX ADMIN — HEPARIN SODIUM 5000 UNITS: 5000 INJECTION INTRAVENOUS; SUBCUTANEOUS at 21:46

## 2024-04-24 RX ADMIN — HEPARIN SODIUM 5000 UNITS: 5000 INJECTION INTRAVENOUS; SUBCUTANEOUS at 09:17

## 2024-04-24 RX ADMIN — SODIUM CHLORIDE: 9 INJECTION, SOLUTION INTRAVENOUS at 11:51

## 2024-04-24 RX ADMIN — SODIUM CHLORIDE, PRESERVATIVE FREE 10 ML: 5 INJECTION INTRAVENOUS at 20:04

## 2024-04-24 RX ADMIN — ATORVASTATIN CALCIUM 80 MG: 80 TABLET, FILM COATED ORAL at 09:12

## 2024-04-24 RX ADMIN — MIRTAZAPINE 7.5 MG: 15 TABLET, FILM COATED ORAL at 20:03

## 2024-04-24 RX ADMIN — Medication 5 MG: at 20:03

## 2024-04-24 RX ADMIN — QUETIAPINE FUMARATE 25 MG: 25 TABLET ORAL at 20:04

## 2024-04-24 RX ADMIN — CEFEPIME 1000 MG: 1 INJECTION, POWDER, FOR SOLUTION INTRAMUSCULAR; INTRAVENOUS at 09:17

## 2024-04-24 ASSESSMENT — PAIN SCALES - PAIN ASSESSMENT IN ADVANCED DEMENTIA (PAINAD)
BREATHING: NORMAL
TOTALSCORE: 1
FACIALEXPRESSION: SMILING OR INEXPRESSIVE
BODYLANGUAGE: RELAXED
CONSOLABILITY: DISTRACTED OR REASSURED BY VOICE/TOUCH

## 2024-04-24 ASSESSMENT — PAIN - FUNCTIONAL ASSESSMENT
PAIN_FUNCTIONAL_ASSESSMENT: PREVENTS OR INTERFERES SOME ACTIVE ACTIVITIES AND ADLS
PAIN_FUNCTIONAL_ASSESSMENT: PREVENTS OR INTERFERES SOME ACTIVE ACTIVITIES AND ADLS

## 2024-04-24 ASSESSMENT — PAIN SCALES - GENERAL
PAINLEVEL_OUTOF10: 0
PAINLEVEL_OUTOF10: 0
PAINLEVEL_OUTOF10: 3
PAINLEVEL_OUTOF10: 0
PAINLEVEL_OUTOF10: 0

## 2024-04-24 ASSESSMENT — PAIN DESCRIPTION - ORIENTATION: ORIENTATION: LEFT

## 2024-04-24 ASSESSMENT — PAIN DESCRIPTION - DESCRIPTORS: DESCRIPTORS: PATIENT UNABLE TO DESCRIBE

## 2024-04-24 ASSESSMENT — PAIN DESCRIPTION - LOCATION: LOCATION: ARM

## 2024-04-24 NOTE — PROGRESS NOTES
Pt became increasingly agitated and combative after her son went home. Attempted re-directing and re-assurance. Pt attempted to kick bite and hit staff. Dr. Figueroa notified. New order for restraints for wrist restraint and ankle restraint due to pt having soft cast and broken arm. Haldol also given as ordered. Sitter and Avasys at the bedside.

## 2024-04-24 NOTE — CONSULTS
,Clinical Pharmacy Progress Note    Vancomycin has been discontinued. Will sign off pharmacy to dose Vancomycin consult.  If medication is restarted and pharmacy is to manage dosing, please re-consult at that time.    Please call with questions--  Thanks--  Eyad ParadaD, BCPS, BCGP  [id number] ([facility])   4/24/2024 1:38 PM

## 2024-04-24 NOTE — PROGRESS NOTES
Hospital Medicine Progress Note      Date of Admission: 4/23/2024  Hospital Day: 2    Chief Admission Complaint:  fall     Subjective:  irate, encephalopathic    Presenting Admission History:       84 y.o. female with PMHx significant for dementia who presented to ED with the complaint of fall.  Patient has history of dementia.  Lives at home by herself however it has been reported that the patient is being monitored by camera.  Patient was found outside of her house in the yard laying on the ground.  It appeared that the patient had suffered a fall.  Patient was brought to the ED for further evaluation.  Patient was found to have left distal ulna and radius fracture.  Also she was found to have left third and fourth rib fractures.  Patient underwent closed reduction of the left wrist fracture with application of the splint.  Labs were remarkable for elevated lactate of 5.0 which trended down to 2.8.  UA is still pending.  Patient was given IV Rocephin empirically by the ED provider.  She is not capable of providing any meaningful history due to underlying dementia.  Patient is currently being admitted under inpatient status for further management and evaluation.  .     Assessment/Plan:      Current Principal Problem:  Sepsis (HCC)    Fall  - PT/OT, placement    Suspected sepsis unknown origin - ruled out  - patient nontoxic appearing with reassuring procal and negative UA  - d/c abx    L radial/ulnar fracture s/p reduction and splinting  - orthopedic surgery following    Delirium on Dementia  - sitter  - Seroquel HS    Type II NSTEMI  - ECG negative for acute ischemia  - no further work up      Physical Exam Performed:      General: NAD  Eyes: EOMI  ENT: neck supple  Cardiovascular: Regular rate.  Respiratory: Clear to auscultation  Gastrointestinal: Soft, non tender  Genitourinary: no suprapubic tenderness  Musculoskeletal: No edema,  Left wrist splint  Skin: warm, dry  Neuro: Alert.  Psych: Mood appropriate.      BP (!) 147/78   Pulse 81   Temp 98.2 °F (36.8 °C) (Oral)   Resp 16   Ht 1.651 m (5' 5\")   Wt 47.6 kg (104 lb 15 oz)   SpO2 95%   BMI 17.46 kg/m²     Diet: ADULT DIET; Regular  DVT Prophylaxis: []PPx LMWH  []SQ Heparin  []IPC/SCDs  []Eliquis  []Xarelto  []Coumadin  []Other -      Code status: Full Code  PT/OT Eval Status:   []NOT yet ordered  []Ordered and Pending   []Seen with Recommendations for:  []Home independently  []Home w/ assist  []HHC  []SNF  []Acute Rehab    Anticipated Discharge Day/Date:  4/25    Anticipated Discharge Location: []Home  []HHC  []SNF  []Acute Rehab  []ECF  []LTAC  []Hospice  []Other -      Consults:      PHARMACY TO DOSE VANCOMYCIN  IP CONSULT TO ORTHOPEDIC SURGERY      This patient has a high likelihood of being discharged tomorrow and is appropriate for A1 Discharge Unit in AM pending clinical course overnight: []Yes  []No    ------------------------------------------------------------------------------------------------------------------------------------------------------------------------    MDM    Level 3  [x] High (any 2)    A. Problems (any 1)  [x] Acute/Chronic Illness/injury posing threat to life or bodily function:  hyperactive delirium increasing staff burden and hospitalist encounters  [] Severe exacerbation of chronic illness:    ---------------------------------------------------------------------  B. Risk of Treatment (any 1)   [] Drugs/treatments that require intensive monitoring for toxicity include:    [] Change in code status:    [x] Decision to escalate care:  Patient being moved to bed right next to nursing station in PCU for better supervision in setting of her hyperactive delirium, sitter ordered  [] Major surgery/procedure with associated risk factors:    ----------------------------------------------------------------------  C. Data (any 2)  [x] Discussed current management and discharge planning options with Case Management.  [] Discussed management of

## 2024-04-24 NOTE — PLAN OF CARE
Problem: Safety - Adult  Goal: Free from fall injury  Outcome: Progressing  Note:  Remains free from falls, bed in low position, call light in reach, bed alarm on for safety, report given to oncoming RN.     Problem: Pain  Goal: Verbalizes/displays adequate comfort level or baseline comfort level  Outcome: Progressing  Note:  PRN Tylenol 650 mg PO at 2052 and 0548 helpful and patient rested throughout the night.

## 2024-04-24 NOTE — PROGRESS NOTES
[facility] -  Clinical Pharmacy Note    Vancomycin - Management by Pharmacy    Consult Date(s): 4/23/24  Consulting Provider(s): Dr. Mitchell    Assessment / Plan  1)  Sepsis of unclear etiology - Vancomycin  Concurrent Antimicrobials: Cefepime  Day of Vanc Therapy / Ordered Duration: 2 of 7  Current Dosing Method: Bayesian-Guided AUC Dosing  Therapeutic Goal: -600 mg/L*hr  Current Dose / Plan:   Pt with slight NATHALIE - baseline SCr appears to be ~0.6.  SCr on presentation 1.3; improved to 0.7 today..  Received 1250mg IV x1 loading dose yesterday AM.  Will continue with 1000mg IV q24h - regimen predicts an AUC = 484 with trough = 11.8 mcg/mL.  Random level is ordered for 4/25 AM to further evaluate above regimen.  Will continue to monitor clinical condition and make adjustments to regimen as appropriate.    Please call with questions--  Thanks--  Sabine Copeland, PharmD, BCPS, BCGP  [id number] ([facility])   4/24/2024 10:11 AM      Interval update:  Seen by ortho - likely moving forward with non-operative management at this time. Arm splinted.  Afebrile and blood cultures with no growth thus far.  Remains on broad spectrum antibiotics for possible sepsis.    Subjective/Objective:   Jennifer Lorenzana is a 84 y.o. female with a PMHx significant for dementia, Rt MCA CVA s/p thrombectomy (2020), aortic stenosis, IBS, and osteoporosis who is admitted with after a fall at home.  Found to have left distal ulna and radius fracture, left 3rd / 4th rib fractures, NATHALIE, and suspected sepsis without clear source.     Pharmacy is consulted to dose Vancomycin.    Ht Readings from Last 1 Encounters:   04/23/24 1.651 m (5' 5\")     Wt Readings from Last 1 Encounters:   04/24/24 47.6 kg (104 lb 15 oz)     Current & Prior Antimicrobial Regimen(s):  Ceftriaxone x1 in ED 4/23  Cefepime (4/23-current)  Vancomycin - Pharmacy to dose  1250mg IV x1 4/23 09:00  1000mg IV q24h (4/24-current)    Vancomycin Level(s) / Doses:    Date Time

## 2024-04-24 NOTE — PROGRESS NOTES
Pt refusing nursing care, lunch and vitals at this time. Pt is tearful and upset stating \"I don't want to live here\". Pt's son at bedside. Dr. Figueroa updated.

## 2024-04-24 NOTE — PROGRESS NOTES
Factors: to exchange gowns- very guarded with LUE and pain with movement  LE Dressing: Maximum assistance  LE Dressing Skilled Clinical Factors: to don socks and new brief  Toileting: Maximum assistance  Toileting Skilled Clinical Factors: incontinent of stool and needed max A to get cleaned up and exchange brief- pt able to wipe front side  Functional Mobility Skilled Clinical Factors: Pt was provided hand held assitance under R UE to ambulate in room and to/from midway of hallway (~100') and back to room. Pt emotional throughout because of wanting to go home and needing cues to redirect. Pt required min-mod A throughout.  Additional Comments: pt very emotional throughout session; L UE NWB status followed throughout with cues  Skin Care: Bath wipes     Bed mobility  Supine to Sit: Moderate assistance  Sit to Supine: 2 Person assistance;Dependent/Total  Scooting: Moderate assistance  Bed Mobility Comments: At end of session with patient up in chair, pt became very agitated with attempts to get up without assist. Pt was assisted up safely to get back to bed. Pt very resistive and required 2 person A for sit to supine transfer for safety. Pt then attempting to get up out of bed and pushing therapist away and attempting to kick tray table out of the way. 3 RN's present at bedside and MD contacted to notify of situation. Pt was left in bed with RNs at bedside.     Vision  Vision: Impaired  Vision Exceptions: Wears glasses at all times  Hearing  Hearing: Within functional limits  Cognition  Overall Cognitive Status: Exceptions  Arousal/Alertness: Appropriate responses to stimuli  Following Commands: Follows one step commands with increased time;Follows one step commands with repetition  Attention Span: Attends with cues to redirect  Memory: Decreased recall of recent events;Decreased short term memory  Safety Judgement: Decreased awareness of need for assistance;Decreased awareness of need for safety  Problem Solving:  perform fx mobility to and from bathroom with SBA  Short Term Goal 4: Pt will perform UB dressing routine with min A  Patient Goals   Patient goals : \"to go home\"       Therapy Time   Individual Concurrent Group Co-treatment   Time In 1100         Time Out 1210         Minutes 70         Timed Code Treatment Minutes: 55 Minutes (+ 15 min OT eval)       Kera Huerta, OT

## 2024-04-24 NOTE — PROGRESS NOTES
Physical Therapy  Facility/Department: [address]  Physical Therapy Initial Assessment    Name: Jennifer Lorenzana  : 1940  MRN: [id number]  Date of Service: 2024    Discharge Recommendations:  Subacute/Skilled Nursing Facility (vs home with 24hr A and HHPT)   PT Equipment Recommendations  Other: continue to assess pending dc plan- may benefit from cane or HW if home      Patient Diagnosis(es): The primary encounter diagnosis was Fall, initial encounter. Diagnoses of Deformity of left wrist and Closed fracture of distal end of left forearm, initial encounter were also pertinent to this visit.  Past Medical History:  has a past medical history of Cancer (HCC), Diverticulitis, and Unspecified cerebral artery occlusion with cerebral infarction.  Past Surgical History:  has a past surgical history that includes Inguinal hernia repair (2011); joint replacement; skin biopsy; and hip surgery (Left, 2023).    Assessment   Body Structures, Functions, Activity Limitations Requiring Skilled Therapeutic Intervention: Decreased functional mobility ;Decreased strength;Decreased cognition;Decreased safe awareness;Decreased endurance;Decreased balance;Increased pain;Decreased posture  Assessment: Pt is from home alone and has dementia at baseline. Pt is now below baseline after fall and resulting 2 rib fxs, L UE wrist fx with new NWB status, and needing A in all areas. Pt required min A for transfers and mod A for ambulation with HHA. pt unsteady and requiring cues for safety and NWB status. Pt initially very excited to work with therapy, then became very tearful and emotional, and at the end of the session became very agitated and combative with RNs present at bedside 2/2 wanting to go home and disorientation. Pt is a high fall risk and unsafe to perform any mobility without assist however agitation and disorientation may worsen with dc to facility for further therapy. Pt would likely benefit from

## 2024-04-24 NOTE — CARE COORDINATION
Case Management Assessment  Initial Evaluation    Date/Time of Evaluation: 4/24/2024 3:55 PM  Assessment Completed by: Laura Lopez    If patient is discharged prior to next notation, then this note serves as note for discharge by case management.    Patient Name: Jennifer Lorenzana                   YOB: 1940  Diagnosis: Fall, initial encounter [W19.XXXA]  Sepsis (HCC) [A41.9]  Closed fracture of distal end of left forearm, initial encounter [S52.92XA]  Deformity of left wrist [M21.932]                   Date / Time: 4/23/2024  1:34 AM    Patient Admission Status: Inpatient   Readmission Risk (Low < 19, Mod (19-27), High > 27): Readmission Risk Score: 17.3    Current PCP: Michelle Jamil MD  PCP verified by CM? Yes    Chart Reviewed: Yes      History Provided by: Patient, Child/Family  Patient Orientation: Alert and Oriented    Patient Cognition: Alert    Hospitalization in the last 30 days (Readmission):  No    If yes, Readmission Assessment in  Navigator will be completed.    Advance Directives:      Code Status: Full Code   Patient's Primary Decision Maker is: Legal Next of Kin (Michael Cantu)      Discharge Planning:    Patient lives with: Alone Type of Home: House  Primary Care Giver: Self  Patient Support Systems include: Children   Current Financial resources: Medicare  Current community resources: None  Current services prior to admission: None            Current DME:              Type of Home Care services:  None    ADLS  Prior functional level: Assistance with the following:, Bathing, Dressing, Toileting, Cooking, Housework, Shopping, Mobility  Current functional level: Assistance with the following:, Bathing, Dressing, Toileting, Cooking, Housework, Shopping, Mobility    PT AM-PAC:   /24  OT AM-PAC: 14 /24    Family can provide assistance at DC: Yes  Would you like Case Management to discuss the discharge plan with any other family members/significant others, and if so, who? Yes (Pepe

## 2024-04-24 NOTE — PROGRESS NOTES
SPEECH PATHOLOGY  Contact note    Order received, chart reviewed, d/w RN who states pt pocketing applesauce/pills.  She reports pt very agitated, however son now present.  D/w son at length who reports since pt stroke, she has some swallowing issues. Has no history of pneumonia and manages without too much difficulty. Pt agitated at this time, concern for increased agitation if eval completed. Son agreeable to recheck tomorrow, when pt has arnoldo LAMBERT M.S./[facility]-SLP #[id number]  Pg. # [phone]

## 2024-04-25 LAB
ANION GAP SERPL CALCULATED.3IONS-SCNC: 9 MMOL/L (ref 3–16)
BUN SERPL-MCNC: 9 MG/DL (ref 7–20)
CALCIUM SERPL-MCNC: 8.1 MG/DL (ref 8.3–10.6)
CHLORIDE SERPL-SCNC: 107 MMOL/L (ref 99–110)
CO2 SERPL-SCNC: 25 MMOL/L (ref 21–32)
CREAT SERPL-MCNC: 0.7 MG/DL (ref 0.6–1.2)
GFR SERPLBLD CREATININE-BSD FMLA CKD-EPI: 84 ML/MIN/{1.73_M2}
GLUCOSE SERPL-MCNC: 96 MG/DL (ref 70–99)
POTASSIUM SERPL-SCNC: 3.8 MMOL/L (ref 3.5–5.1)
SODIUM SERPL-SCNC: 141 MMOL/L (ref 136–145)

## 2024-04-25 PROCEDURE — 36415 COLL VENOUS BLD VENIPUNCTURE: CPT

## 2024-04-25 PROCEDURE — 6370000000 HC RX 637 (ALT 250 FOR IP): Performed by: INTERNAL MEDICINE

## 2024-04-25 PROCEDURE — 6360000002 HC RX W HCPCS: Performed by: INTERNAL MEDICINE

## 2024-04-25 PROCEDURE — 80048 BASIC METABOLIC PNL TOTAL CA: CPT

## 2024-04-25 PROCEDURE — 6370000000 HC RX 637 (ALT 250 FOR IP): Performed by: SURGERY

## 2024-04-25 PROCEDURE — 2580000003 HC RX 258: Performed by: INTERNAL MEDICINE

## 2024-04-25 PROCEDURE — 1200000000 HC SEMI PRIVATE

## 2024-04-25 RX ADMIN — ATORVASTATIN CALCIUM 80 MG: 80 TABLET, FILM COATED ORAL at 10:41

## 2024-04-25 RX ADMIN — HEPARIN SODIUM 5000 UNITS: 5000 INJECTION INTRAVENOUS; SUBCUTANEOUS at 21:16

## 2024-04-25 RX ADMIN — CITALOPRAM HYDROBROMIDE 10 MG: 10 TABLET ORAL at 10:41

## 2024-04-25 RX ADMIN — SODIUM CHLORIDE, PRESERVATIVE FREE 5 ML: 5 INJECTION INTRAVENOUS at 10:48

## 2024-04-25 RX ADMIN — SODIUM CHLORIDE: 9 INJECTION, SOLUTION INTRAVENOUS at 03:25

## 2024-04-25 RX ADMIN — MIRTAZAPINE 7.5 MG: 15 TABLET, FILM COATED ORAL at 21:14

## 2024-04-25 RX ADMIN — QUETIAPINE FUMARATE 25 MG: 25 TABLET ORAL at 21:14

## 2024-04-25 RX ADMIN — SODIUM CHLORIDE, PRESERVATIVE FREE 10 ML: 5 INJECTION INTRAVENOUS at 21:17

## 2024-04-25 ASSESSMENT — PAIN SCALES - PAIN ASSESSMENT IN ADVANCED DEMENTIA (PAINAD)
BREATHING: NORMAL
TOTALSCORE: 0
TOTALSCORE: 0
BODYLANGUAGE: RELAXED
FACIALEXPRESSION: SMILING OR INEXPRESSIVE
BODYLANGUAGE: RELAXED
FACIALEXPRESSION: SMILING OR INEXPRESSIVE
CONSOLABILITY: NO NEED TO CONSOLE
CONSOLABILITY: NO NEED TO CONSOLE
BREATHING: NORMAL

## 2024-04-25 ASSESSMENT — PAIN SCALES - GENERAL
PAINLEVEL_OUTOF10: 0

## 2024-04-25 NOTE — PROGRESS NOTES
Speech pathology  Attempt      Chart reviewed, d/w RN April earlier this morning. Pt sleeping at that time, RN requested allowing pt to sleep.  RN will contact SLP if pt eating later today, as pt has cont with agitation.        TRACI LAMBERT M.S./[facility]-SLP #[id number]  Pg. # [phone]

## 2024-04-25 NOTE — PROGRESS NOTES
Physical /Occupational Therapy    Spoke with RN, requesting to hold patient today as she is restrained and remains confused/unable to appropriately follow commands. Will follow up per POC as appropriate.     Yamilex Oliveros, PT, DPT

## 2024-04-25 NOTE — PROGRESS NOTES
This RN came in to shift, received bedside report, noted pt was in restraint of R wrist and L ankle, per dayshift RN April, she got order via perfect serve for L ankle instead of L wrist due to injury of L arm. Messages back and forth sent to Dr Figueroa who ordered restraint for modification to include R wrist AND L ankle both. Charge BOBY Rueda was made aware.    Pt received seroquel, melatonin and tylenol with apple sauce. Pt has got calmer since shift started. Sitter by bedside as well as camera in place for patient and staff's safety.   · Chief Complaint: The patient is a 52y Male complaining of wound check.  · HPI Objective Statement: 51 y/o male hx dm (no longer taking meds trying to control with diet), chronic right foot ulcer sent by Dr. Jain for iv abx and admission due to worsening foot ulcer. pt on oral clindamycin for several days, blister popped and having red streaking to foot. denies pain, no f/c, no other complaints. anaphylaxis to penicillin per pt.     	ROS: No fever/chills. No eye pain/changes in vision, No ear pain/sore throat/dysphagia, No chest pain/palpitations. No SOB/cough/. No abdominal pain, N/V/D, no black/bloody bm. No dysuria/frequency/discharge, No headache. No Dizziness.    No numbness/tingling/weakness.    Podiatry HPI: Patient is a 48M PMH DM, h/o gout, sent in by Dr. Jain for chronic osteomyelitis of right hallux with wound. Patient states that wound was getting worse and was seen in office on Thursday and received po abx. Patient states that today he noticed a new blister form on the inner side of his right big toe and decided to come to ED after calling Dr. Jain. Patient states he has no pain and no sensation to foot. He states that he has no other pedal complaints. Patient understands he may need amputation of the right big toe. Patient denies any fever, shares that on admission it was 98.7. Denies nausea, vomiting sob, chills, chest pain     Patient admits to  (-) Fevers, (-) Chills, (-) Nausea, (-) Vomiting, (-) Shortness of Breath (-) calf pain (-) chest pain     Podiatry interval HPI: Patient seen in bed, resting comfortably, s/p partial 1st ray resection right foot 11/22/21. Does not complain of any pain. Denies any acute overnight events. Denies any changes in his symptoms.     Medications acetaminophen     Tablet .. 650 milliGRAM(s) Oral every 6 hours PRN  clindamycin IVPB 900 milliGRAM(s) IV Intermittent every 8 hours  Dakins Solution - 1/4 Strength 1 Application(s) Topical once  dextrose 40% Gel 15 Gram(s) Oral once  dextrose 5%. 1000 milliLiter(s) IV Continuous <Continuous>  dextrose 5%. 1000 milliLiter(s) IV Continuous <Continuous>  dextrose 50% Injectable 25 Gram(s) IV Push once  dextrose 50% Injectable 12.5 Gram(s) IV Push once  dextrose 50% Injectable 25 Gram(s) IV Push once  fentaNYL    Injectable 25 MICROGram(s) IV Push every 5 minutes PRN  glucagon  Injectable 1 milliGRAM(s) IntraMuscular once  influenza   Vaccine 0.5 milliLiter(s) IntraMuscular once  insulin glargine Injectable (LANTUS) 15 Unit(s) SubCutaneous at bedtime  insulin lispro (ADMELOG) corrective regimen sliding scale   SubCutaneous three times a day before meals  insulin lispro (ADMELOG) corrective regimen sliding scale   SubCutaneous at bedtime  insulin lispro Injectable (ADMELOG) 4 Unit(s) SubCutaneous three times a day before meals  lactated ringers. 1000 milliLiter(s) IV Continuous <Continuous>  lidocaine 1% (Preservative-free) Injectable 20 milliLiter(s) Local Injection once  meropenem  IVPB 1000 milliGRAM(s) IV Intermittent every 8 hours  ondansetron Injectable 4 milliGRAM(s) IV Push once PRN  sodium chloride 0.9%. 1000 milliLiter(s) IV Continuous <Continuous>  vancomycin  IVPB 1000 milliGRAM(s) IV Intermittent every 12 hours    FHFHx: diabetes mellitus (Grandparent)    ,   PMHDiabetes mellitus    H/O diabetic neuropathy       PSHHistory of partial amputation of toe        Labs                          12.9   11.49 )-----------( 412      ( 23 Nov 2021 07:42 )             38.2      11-23    132<L>  |  98  |  8.9  ----------------------------<  211<H>  4.6   |  23.0  |  1.16    Ca    8.4<L>      23 Nov 2021 07:42    TPro  8.9<H>  /  Alb  3.4  /  TBili  0.9  /  DBili  x   /  AST  21  /  ALT  15  /  AlkPhos  106  11-21     Vital Signs Last 24 Hrs  T(C): 37.4 (23 Nov 2021 11:32), Max: 37.4 (23 Nov 2021 11:32)  T(F): 99.3 (23 Nov 2021 11:32), Max: 99.3 (23 Nov 2021 11:32)  HR: 70 (23 Nov 2021 11:32) (70 - 87)  BP: 116/76 (23 Nov 2021 11:32) (101/70 - 125/77)  BP(mean): 81 (22 Nov 2021 20:00) (81 - 83)  RR: 18 (23 Nov 2021 11:32) (14 - 22)  SpO2: 95% (23 Nov 2021 11:32) (95% - 100%)  Sedimentation Rate, Erythrocyte: 44 mm/hr (11-21-21 @ 17:37)         C-Reactive Protein, Serum: 173 mg/L (11-21-21 @ 17:37)   WBC Count: 11.49 K/uL *H* (11-23-21 @ 07:42)      Physical exam   Patient resting comfortably in bed. Patient is AAOx3, ambulation status: walking without limitations     Derm: s/p partial 1st ray resection, noted with sutures in place, intact, with small opening to the distal tip of remaining skin measuring about 1.0 x 1.0 cm, packing noted to be in place. Scant amount of sanguinous drainage noted. No malodor. No active purulent drainage. No fluctuance.    right submet 5 ulcer with serous drainage does not probe to bone. Granular wound base. erythema noted with ischemic changes to distal hallux    Vasc: DP Palpable, PT nonpalpable right, DP and PT palpable to left. edema noted to right hallux Skin temperature noted to be warm to warm from proximal to distal b/l.   Neuro: Protective and epicritic sensation grossly absent  Musc:  s/p partial 5th, partial 1st ray amp on right, sp partial 1st amp on left    xrays 11/21: official read pending, possible soft tissue emphysema noted to right foot first interspace. 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 Flushed open wound with saline, followed by dakins at bedside. Dried area thoroughly and then applied betadine, DSD to the area.   Intra op culture, pathology pending   Recommend PICC line due to extensive infection seen while in operating room, poor bone quality.   Pending re-evaluation 11/24 with Dr. Jain, patient stable from podiatric standpoint  Podiatry to follow while in house   Discussed with attending Dr. Jain

## 2024-04-25 NOTE — DISCHARGE SUMMARY
V2.0  Discharge Summary    Name:  Jennifer Lorenzana /Age/Sex: 1940 (84 y.o. female)   Admit Date: 2024  Discharge Date: 24    MRN & CSN:  [id number] & [id number] Encounter Date and Time 24 1:14 PM EDT    Attending:  Sean Figueroa MD Discharging Provider: Sean Figueroa MD       Discharge Diagnosess:     Fall  L radial/ulnar fracture s/p reduction and splinting  Type II NSTEMI  Dementia  Delirium      Admission HPI, hospital course, and consultants:     Admission HPI:  \"84 y.o. female with PMHx significant for dementia who presented to ED with the complaint of fall. Patient has history of dementia. Lives at home by herself however it has been reported that the patient is being monitored by camera. Patient was found outside of her house in the yard laying on the ground. It appeared that the patient had suffered a fall. Patient was brought to the ED for further evaluation. Patient was found to have left distal ulna and radius fracture. Also she was found to have left third and fourth rib fractures. Patient underwent closed reduction of the left wrist fracture with application of the splint. Labs were remarkable for elevated lactate of 5.0 which trended down to 2.8. UA is still pending. Patient was given IV Rocephin empirically by the ED provider. She is not capable of providing any meaningful history due to underlying dementia. Patient is currently being admitted under inpatient status for further management and evaluation. \"    Hospital course:  Fall  - PT/OT, placement     Suspected sepsis unknown origin - ruled out  - patient nontoxic appearing with reassuring procal and negative UA  - d/c abx     L radial/ulnar fracture s/p reduction and splinting  - orthopedic surgery recommending outpatient f/u to discuss management options     Delirium on Dementia  - sitter  - Seroquel HS     Type II NSTEMI  - ECG negative for acute ischemia  - no further work up    The patient expressed appropriate understanding  of, and agreement with the discharge recommendations, medications, and plan.     Consults this admission:  PHARMACY TO DOSE VANCOMYCIN  IP CONSULT TO ORTHOPEDIC SURGERY    Discharge Instructions:   Follow up appointments: orthopedic surgery  Primary care physician: Michelle Jamil MD within 2 weeks  Diet: regular diet   Activity: activity as tolerated  Disposition: Discharged to:   []Home, []C, [x]SNF, []Acute Rehab, []Hospice   Condition on discharge: Stable  Labs and Tests to be Followed up as an outpatient by PCP or Specialist: none    Discharge Medications:        Medication List        CONTINUE taking these medications      apixaban 2.5 MG Tabs tablet  Commonly known as: ELIQUIS     citalopram 10 MG tablet  Commonly known as: CELEXA     CITRACAL CALCIUM+D PO     mirtazapine 15 MG tablet  Commonly known as: REMERON             Objective Findings at Discharge:   /70   Pulse 99   Temp 98.1 °F (36.7 °C) (Axillary)   Resp 16   Ht 1.651 m (5' 5\")   Wt 47.6 kg (104 lb 15 oz)   SpO2 91%   BMI 17.46 kg/m²       Physical Exam:   General: NAD  Eyes: EOMI  ENT: neck supple  Cardiovascular: Regular rate.  Respiratory: Clear to auscultation  Gastrointestinal: Soft, non tender  Genitourinary: no suprapubic tenderness  Musculoskeletal: No edema  Skin: warm, dry  Neuro: Alert.  Psych: Mood appropriate.         Labs and Imaging   XR PELVIS (1-2 VIEWS)    Result Date: 4/23/2024  XR PELVIS (1-2 VIEWS). AP pelvis  4/23/2024 16:41 EDT. CLINICAL HISTORY:f fall and left hip fracture s/p operative fixation in past COMPARISON: [May 2023 Interval placement of intramedullary goyo and compression screw on the left. Bony hypertrophy at the lesser trochanter. Incomplete evaluation of the left hip. No acute fracture. No osseous destructive lesion.     Postoperative findings left hip. Electronically signed by Nancy Murguia    XR WRIST LEFT (MIN 3 VIEWS)    Result Date: 4/23/2024  Reason: Post reduction X-ray wrist  ulnar fractures as described. Patient's bones are markedly osteopenic. The metacarpal carpal bones appear intact. Marked joint space during, osteophyte formation of the first carpal metacarpal joint.     1. Markedly displaced fractures involving the distal radius and ulna 2. Marked osteoarthritis first carpal metacarpal joint 3. Osteopenia Electronically signed by MD Bari Trevino    XR RADIUS ULNA LEFT (2 VIEWS)    Result Date: 4/23/2024  Reason: Fall, pain X-ray radius or ulna left 2 views FINDINGS: Cortical disruption identified involving the distal radius and ulna is are markedly displaced. The elbow appears grossly intact.     Markedly displaced distal radial distal ulnar fractures. Electronically signed by MD Bari Trevino    XR SHOULDER LEFT (MIN 2 VIEWS)    Result Date: 4/23/2024  Reason: Left shoulder pain X-ray shoulder left minimum 2 views FINDINGS: Acute angulation of the left third and fourth ribs identified glenohumeral joints located. No cortical disruption. Sclerosis identified greater tuberosity.     1. Possible nondisplaced rib fractures involving the left third and fourth ribs laterally 2. Nonspecific sclerosis greater tuberosity which can be seen in rotator cuff disease Electronically signed by MD Bari Trevino    XR CHEST PORTABLE    Result Date: 4/23/2024  Reason: Status post fall, pain AP chest FINDINGS: Pulmonary vascularity is mildly congested. No large effusions. Evidence of angulation of lateral left third fourth ribs.     1. Acute angulation of the left third and fourth ribs which may related to nondisplaced fractures 2. Cardiomegaly with mild pulmonary vascular congestion Electronically signed by MD Bari Trevino      CBC:   Recent Labs     04/23/24  0158   WBC 14.6*   HGB 13.8        BMP:    Recent Labs     04/23/24  0158 04/24/24  0439 04/25/24  0509    137 141   K 4.2 4.0 3.8    105 107   CO2 24 23 25   BUN 17 12 9   CREATININE 1.3* 0.7 0.7   GLUCOSE 214* 106* 96

## 2024-04-25 NOTE — PROGRESS NOTES
Physician Progress Note      PATIENT:               YORDAN MCCORMICK  CSN #:                  [id number]  :                       1940  ADMIT DATE:       2024 1:34 AM  DISCH DATE:  RESPONDING  PROVIDER #:        Sean Figueroa MD          QUERY TEXT:    Patient admitted with displaced  left radius and ulna along with 3rd and 4th   rib fxs following a fall.  Noted documentation of type II MI throughout the   chart.  Per DC Summary: Type II NSTEMI - ECG negative for acute ischemia, no   further workup. In order to support the diagnosis of type II MI, please refer   to 4th universal definition of MI below and include additional clinical   indicators in your documentation.  Or please document if the diagnosis of type   II MI has been ruled out after study.    The medical record reflects the following:  Risk Factors: 84 y.o. female with PMH cancer, Diverticulitis, Dementia,   cerebral artery occlusion with cerebrl infarction  Clinical Indicators: Troponin 51, 53  Treatment: Monitor troponin, ECG    Fourth Universal Definition of Myocardial Infarction:  Clearly separates MI   from myocardial injury. Patients with elevated blood troponin levels but   without clinical evidence of ischemia are said to have had a myocardial   injury.? To have a myocardial infarction requires both an elevated troponin   blood test along with at least one of the following:  - Symptoms of acute myocardial ischemia (Types 1 - 5 MI)  - Clinical evidence of ischemia, as evidenced in an electrocardiogram (EKG)   showing new ischemic changes (Type 1, Type 2, Type 3, or Type 4a MI)  - Development of pathological Q waves (Types 1 - 5 MI)  - Imaging evidence of new loss of viable myocardium or new regional wall   motion abnormality in a pattern consistent with an ischemic etiology (Types 1   - 5 MI)  Options provided:  -- MI type II ruled out  -- MI type II confirmed as evidenced by, Please specify supporting evidence.  -- Other - I will add my own

## 2024-04-25 NOTE — DISCHARGE INSTR - COC
Continuity of Care Form    Patient Name: Jennifer Lorenzana   :  1940  MRN:  [id number]    Admit date:  2024  Discharge date:  24    Code Status Order: Full Code   Advance Directives:     Admitting Physician:  Shruti Mitchell MD  PCP: Michelle Jamil MD    Discharging Nurse:   Discharging Hospital Unit/Room#: 6313/6313-01  Discharging Unit Phone Number: [phone]    Emergency Contact:   Extended Emergency Contact Information  Primary Emergency Contact: Immanuel Lorenzana   [facility]  Home Phone: [phone]  Mobile Phone: [phone]  Relation: Child  Secondary Emergency Contact: Cameron Briceno  Home Phone: [phone]  Relation: Child    Past Surgical History:  Past Surgical History:   Procedure Laterality Date    HIP SURGERY Left 2023    LEFT HIP GAMMA NAILING performed by Phillip Gonzalez MD at [facility] OR    INGUINAL HERNIA REPAIR  2011    HERNIA INGUINAL REPAIR LEFT WITH POSSIBLE SMALL BOWEL    JOINT REPLACEMENT      SKIN BIOPSY         Immunization History:   Immunization History   Administered Date(s) Administered    COVID-19, MODERNA Bivalent, (age 12y+), IM, 50 mcg/0.5 mL 2022    COVID-19, MODERNA Booster BLUE border, (age 18y+), IM, 50mcg/0.25mL 2022    COVID-19, PFIZER PURPLE top, DILUTE for use, (age 12 y+), 30mcg/0.3mL 2021, 2021, 2021       Active Problems:  Patient Active Problem List   Diagnosis Code    Incarcerated femoral hernia K41.30    Closed displaced intertrochanteric fracture of left femur ([facility]) S72.142A    GI bleed K92.2    Sepsis ([facility]) A41.9       Isolation/Infection:   Isolation            No Isolation          Patient Infection Status       None to display            Nurse Assessment:  Last Vital Signs: /88   Pulse 71   Temp 97.6 °F (36.4 °C) (Axillary)   Resp 16   Ht 1.651 m (5' 5\")   Wt 47.6 kg (104 lb 15 oz)   SpO2 96%   BMI 17.46 kg/m²     Last documented pain score (0-10 scale): Pain Level: 0  Last

## 2024-04-25 NOTE — PROGRESS NOTES
Pt alert but disoriented x 4. VSS. Pt hasn't complained of arm pain since tylenol given. No nv, sob, bm this shift. Pt was incontinent of bladder. Pt remains calm in restraint of R wrist and L ankle. Bedside sitter, bed alarm and camera were in place for safety.   Medicine Dr. Munoz was updated about pt's condition, ok to d/c bedside sitter. Sitter removed from the bedside.   Will cont to monitor.

## 2024-04-25 NOTE — CARE COORDINATION
CM following for DC planning and needs. CM spoke with patients son Michael, he reports that him and his sister are considering SNF for patient, CM provided SNF list for son and daughter to review. Son coming back to hospital soon to see patient. CM left list at bedside and son plans to further discuss with his sister for choices. He is nervous about SNF stay because of bad experience at [facility] previously.     Patient out of restraints and calm and resting at bedside this AM, restraints off at 1026 this AM. Patient will need anthem pre-cert for SNF admission once accepted for SNF.    CM will follow up again with son and daughter later this afternoon for update, unless they return call first with choices.    5:12 PM  CM spoke with daughter Debra, she is working with her brothers to determine SNF placement. CM emailed SNF list to her at [email or web address] and [email or web address] for her to review and discuss with her brothers. CM asked her to please return call with choices of SNF's and CM will make referrals and work on anthem pre-cert for patient.     Family feels patient is being rushed out of the hospital and would like to appeal the discharge.  Patient/family has chosen to appeal discharge, IMM signed, Detailed Notice of Discharge delivered to patient and reviewed with patient/family. Patient/family aware they must initiate appeal process with Jonna, number provided for contact.    IMM Letter  IMM Letter given to Patient/Family/Significant other/Guardian/POA/by:: Del YEPEZ, RN,CM  IMM Letter date given:: 04/25/24  IMM Letter time given:: 1652    [employer]onna Appeal Case #: [id number] (awaiting case number)    Documentation for Discharge Appeal  Discharge Appealed by: Family  Date notifed by QIO of appeal request:: 04/25/24  Detailed Notice of Discharge given to:: Family (emailed to daughter Debra Briceno)  Date Notice of Discharge given:: 04/25/24  Time Notice of Discharge  McElwrath, RN  [facility]  Case Management Department  Ph: [phone]   Fax: [phone]

## 2024-04-26 PROCEDURE — 97530 THERAPEUTIC ACTIVITIES: CPT

## 2024-04-26 PROCEDURE — 2580000003 HC RX 258: Performed by: INTERNAL MEDICINE

## 2024-04-26 PROCEDURE — 97116 GAIT TRAINING THERAPY: CPT

## 2024-04-26 PROCEDURE — 6370000000 HC RX 637 (ALT 250 FOR IP): Performed by: SURGERY

## 2024-04-26 PROCEDURE — 6370000000 HC RX 637 (ALT 250 FOR IP): Performed by: INTERNAL MEDICINE

## 2024-04-26 PROCEDURE — 97535 SELF CARE MNGMENT TRAINING: CPT

## 2024-04-26 PROCEDURE — 6360000002 HC RX W HCPCS: Performed by: INTERNAL MEDICINE

## 2024-04-26 PROCEDURE — 92610 EVALUATE SWALLOWING FUNCTION: CPT

## 2024-04-26 PROCEDURE — 1200000000 HC SEMI PRIVATE

## 2024-04-26 RX ADMIN — Medication 5 MG: at 21:46

## 2024-04-26 RX ADMIN — HEPARIN SODIUM 5000 UNITS: 5000 INJECTION INTRAVENOUS; SUBCUTANEOUS at 21:46

## 2024-04-26 RX ADMIN — ACETAMINOPHEN 650 MG: 650 SOLUTION ORAL at 21:47

## 2024-04-26 RX ADMIN — MIRTAZAPINE 7.5 MG: 15 TABLET, FILM COATED ORAL at 21:46

## 2024-04-26 RX ADMIN — HEPARIN SODIUM 5000 UNITS: 5000 INJECTION INTRAVENOUS; SUBCUTANEOUS at 08:24

## 2024-04-26 RX ADMIN — SODIUM CHLORIDE, PRESERVATIVE FREE 10 ML: 5 INJECTION INTRAVENOUS at 21:48

## 2024-04-26 RX ADMIN — QUETIAPINE FUMARATE 25 MG: 25 TABLET ORAL at 21:46

## 2024-04-26 RX ADMIN — SODIUM CHLORIDE, PRESERVATIVE FREE 10 ML: 5 INJECTION INTRAVENOUS at 08:25

## 2024-04-26 RX ADMIN — CITALOPRAM HYDROBROMIDE 10 MG: 10 TABLET ORAL at 08:24

## 2024-04-26 RX ADMIN — ATORVASTATIN CALCIUM 80 MG: 80 TABLET, FILM COATED ORAL at 08:24

## 2024-04-26 ASSESSMENT — PAIN SCALES - PAIN ASSESSMENT IN ADVANCED DEMENTIA (PAINAD)
BODYLANGUAGE: RELAXED
CONSOLABILITY: NO NEED TO CONSOLE
BREATHING: NORMAL
TOTALSCORE: 0
CONSOLABILITY: NO NEED TO CONSOLE
BREATHING: NORMAL
BODYLANGUAGE: RELAXED
TOTALSCORE: 0
FACIALEXPRESSION: SMILING OR INEXPRESSIVE
FACIALEXPRESSION: SMILING OR INEXPRESSIVE

## 2024-04-26 ASSESSMENT — PAIN DESCRIPTION - DESCRIPTORS: DESCRIPTORS: DISCOMFORT

## 2024-04-26 ASSESSMENT — PAIN DESCRIPTION - PAIN TYPE: TYPE: ACUTE PAIN

## 2024-04-26 ASSESSMENT — PAIN SCALES - GENERAL
PAINLEVEL_OUTOF10: 3
PAINLEVEL_OUTOF10: 0
PAINLEVEL_OUTOF10: 0

## 2024-04-26 ASSESSMENT — PAIN DESCRIPTION - FREQUENCY: FREQUENCY: INTERMITTENT

## 2024-04-26 ASSESSMENT — PAIN - FUNCTIONAL ASSESSMENT: PAIN_FUNCTIONAL_ASSESSMENT: ACTIVITIES ARE NOT PREVENTED

## 2024-04-26 ASSESSMENT — PAIN DESCRIPTION - ONSET: ONSET: GRADUAL

## 2024-04-26 ASSESSMENT — PAIN DESCRIPTION - LOCATION: LOCATION: ARM;GENERALIZED

## 2024-04-26 ASSESSMENT — PAIN DESCRIPTION - ORIENTATION: ORIENTATION: LEFT

## 2024-04-26 NOTE — CARE COORDINATION
[employer] appeal received and all clinical, IMM and DND have been uploaded to the [employer] portal pending review and decision.   Electronically signed by Michelle Gutierrez RN on 4/26/2024 at 12:38 PM   [phone]

## 2024-04-26 NOTE — CARE COORDINATION
Cardiac Daughter called back with discharge medicare appeal case identifier (#[id number]). Also, daughter, Cameron, called with suggestion for referral to [facility] and she will call later with other SNFs.  Electronically signed by Monica Su RN on 4/26/2024 at 10:14 AM      Cardiac

## 2024-04-26 NOTE — PROGRESS NOTES
Pt up in room with standby assist. Cleaned up, teeth brushed and face washed. In chair with breakfast, feeding self. [place]wick back in place for incontinent episodes. Very pleasant and easy to redirect.

## 2024-04-26 NOTE — CARE COORDINATION
Daughter called back with other SNFs to add to referrals: [facility]( no beds), [facility]. Referrals sent to all. CM will continue to follow patient until discharge.  Electronically signed by Monica Su RN on 4/26/2024 at 5:42 PM

## 2024-04-26 NOTE — PLAN OF CARE
Problem: Safety - Adult  Goal: Free from fall injury  Outcome: Progressing  Flowsheets (Taken 4/25/2024 2257)  Free From Fall Injury: Based on caregiver fall risk screen, instruct family/caregiver to ask for assistance with transferring infant if caregiver noted to have fall risk factors  Note: Patient is free from fall. Ensure all safety measures are in place. Bed lock in lowest position, bed alarm on, gripper sock worn by patient, bedside table with patient's belongings and call light within reach.      Problem: Chronic Conditions and Co-morbidities  Goal: Patient's chronic conditions and co-morbidity symptoms are monitored and maintained or improved  Outcome: Progressing  Flowsheets (Taken 4/25/2024 2257)  Care Plan - Patient's Chronic Conditions and Co-Morbidity Symptoms are Monitored and Maintained or Improved:   Monitor and assess patient's chronic conditions and comorbid symptoms for stability, deterioration, or improvement   Collaborate with multidisciplinary team to address chronic and comorbid conditions and prevent exacerbation or deterioration   Update acute care plan with appropriate goals if chronic or comorbid symptoms are exacerbated and prevent overall improvement and discharge     Problem: Discharge Planning  Goal: Discharge to home or other facility with appropriate resources  Outcome: Progressing

## 2024-04-26 NOTE — PROGRESS NOTES
Physical Therapy  Facility/Department: [address]  Daily Treatment Note  NAME: Jennifer Lorenzana  : 1940  MRN: [id number]    Date of Service: 2024    Discharge Recommendations:  Subacute/Skilled Nursing Facility (vs home with 24hr and HHPT)   PT Equipment Recommendations  Other: continue to assess pending dc plan- may benefit from cane or HW if home    Patient Diagnosis(es): The primary encounter diagnosis was Fall, initial encounter. Diagnoses of Deformity of left wrist and Closed fracture of distal end of left forearm, initial encounter were also pertinent to this visit.    Pt hx: 84 y.o. female with PMHx significant for dementia who presented to ED with the complaint of fall, found down outside by neighbors. Patient was found to have left distal ulna and radius fracture. Also she was found to have left third and fourth rib fractures. Patient underwent closed reduction of the left wrist fracture with application of the splint, now NWB LUE. Also admitted for possible sepsis, UTI.     Assessment   Assessment: pt tolerated session well this date with decreased agitation and cognition seems to be improving. pt remains below IND baseline and has a hx of multiple falls, most recent resulting in L distal radius fx and now NWB on LUE. pt requiring min  A for bed mobility, min-mod A for transfers, and min A for short distance ambulation with HHA. pt is a high fall risk and unsafe to perform mobility alone. Pt remains very hopeful to return home and continually states she misses her son. Pt would likely do better in familiar environment 2/2 baseline dementia however would require 24hr capable A and HHPT. If 24hr A is not available, pt will benefit from short SNF stay. PT to f/u  Activity Tolerance: Patient tolerated treatment well;Patient limited by pain  Other: continue to assess pending dc plan- may benefit from cane or HW if home     Plan    Physical Therapy Plan  General Plan:  (2-5)  Current Treatment  surfaces)  Stand to Sit: Minimum assistance (for controlled descent)  Gait Training  Right Side Weight Bearing: As tolerated  Left Side Weight Bearing: As tolerated  Gait  Gait Training: Yes  Left Side Weight Bearing: As tolerated  Right Side Weight Bearing: As tolerated  Overall Level of Assistance: Minimum assistance (+ CGA of another for LUE support)  Distance (ft): 10 Feet (x2 + 15'x2)  Assistive Device: Gait belt (HHA on RUE)  Interventions: Safety awareness training;Verbal cues  Speed/Gayla: Shuffled;Slow  Step Length: Left shortened;Right shortened  Gait Abnormalities: Decreased step clearance;Shuffling gait (mildly unsteady)        Other Specialty Interventions  Other Treatments/Modalities: pts brief soiled on arrival, assisted to BR for pericare and new brief. ADLs performed at sink with OT in seated position  Safety Devices  Type of Devices: Call light within reach;Gait belt;Chair alarm in place;Nurse notified;Left in chair;Patient at risk for falls  Restraints  Restraints Initially in Place: No       Goals  Short Term Goals  Time Frame for Short Term Goals: by dc (all ongoing)   Short Term Goal 1: pt will perform bed mobility with mod I  Short Term Goal 2: pt will perform functional transfers with LRAD and mod I  Short Term Goal 3: pt will ambulate 50' with LRAD and supervision  Patient Goals   Patient Goals : to go home    Education  Patient Education  Education Given To: Patient  Education Provided: Role of Therapy;Plan of Care;Precautions  Education Method: Demonstration;Verbal  Barriers to Learning: Cognition  Education Outcome: Continued education needed;Verbalized understanding    AM-PAC - Mobility    AM-PAC Basic Mobility - Inpatient   How much help is needed turning from your back to your side while in a flat bed without using bedrails?: A Little  How much help is needed moving from lying on your back to sitting on the side of a flat bed without using bedrails?: A Little  How much help is needed

## 2024-04-26 NOTE — PROGRESS NOTES
Speech Language Pathology  [address]  [address]ysphagia  Evaluation    Name: Jennifer Lorenzana  : 1940  MRN: [id number]                                                    Patient Diagnosis(es):   Patient Active Problem List    Diagnosis Date Noted    Sepsis (HCC) 2024    GI bleed 06/15/2023    Closed displaced intertrochanteric fracture of left femur (HCC) 2023    Incarcerated femoral hernia 2011     Past Medical History:   Diagnosis Date    Cancer (HCC)     Diverticulitis     Unspecified cerebral artery occlusion with cerebral infarction      Past Surgical History:   Procedure Laterality Date    HIP SURGERY Left 2023    LEFT HIP GAMMA NAILING performed by Phillip Gonzalez MD at [facility] OR    INGUINAL HERNIA REPAIR  2011    HERNIA INGUINAL REPAIR LEFT WITH POSSIBLE SMALL BOWEL    JOINT REPLACEMENT      SKIN BIOPSY       Reason for Referral:  Jennifer Lorenzana  was referred for a Speech Therapy evaluation to assess swallow function and/or communication.    History of Present Illness  Per MD notes:  \" 84 y.o. female with PMHx significant for dementia who presented to ED with the complaint of fall.  Patient has history of dementia.  Lives at home by herself however it has been reported that the patient is being monitored by camera.  Patient was found outside of her house in the yard laying on the ground.  It appeared that the patient had suffered a fall.  Patient was brought to the ED for further evaluation.  Patient was found to have left distal ulna and radius fracture.  Also she was found to have left third and fourth rib fractures.  Patient underwent closed reduction of the left wrist fracture with application of the splint.  Labs were remarkable for elevated lactate of 5.0 which trended down to 2.8.  UA is still pending.  Patient was given IV Rocephin empirically by the ED provider.  She is not capable of providing any meaningful history due to underlying dementia.

## 2024-04-26 NOTE — PROGRESS NOTES
Occupational Therapy  Daily Treatment Note  Patient Name: Jennifer Lorenzana  MRN: [id number]    Assessment: Pt was pleasant and cooperative during therapy session today. She seems to be feeling much better and was able to perform functional mobility with min-mod assist and participate in ADLs when seated. Pt will need continued inpt OT/PT at d/c to promote return to PLOF.       Discharge Recommendations: SNF (if home, 24 hr A and home OT/PT)  Equipment Needs:  No  Ampac Score: 13    Chart Reviewed: Yes     Other position/activity restrictions: NWB LUE; up as tolerated   Additional Pertinent Hx: 84 y.o. female with PMHx significant for dementia who presented to ED with the complaint of fall, found down outside by neighbors. Patient was found to have left distal ulna and radius fracture.  Also she was found to have left third and fourth rib fractures.  Patient underwent closed reduction of the left wrist fracture with application of the splint, now NWB LUE. Also admitted for possible sepsis, UTI.    Diagnosis: Sepsis  Treatment Diagnosis: decreased independence 2/2 NWB status on L UE    Subjective: Pt in bed resting on arrival. Very pleasant and cooperative today. \"Thank you for doing that for me.\"     Pain: Pt c/o L arm pain with activity, positioned well with pillow after session with no complaints at rest    Objective:    Cognition/Orientation: impaired - alert, oriented to self, recalled PT from previous visit, followed simple commands consistently, cues to problem solve, cooperative.    Bed mobility   Supine to sit: Min assist (HOB raised)  Scooting: Min assist    Functional Mobility   Sit to Stand: varies min to mod assist depending on surface height (recliner, bed, toilet, chair without armrests)  Stand to Sit: Min assist, min cues  Commode Transfer: Mod assist, min cues, grab bar  Armchair transfer: Min assist, min cues, hand held assist  Armlress chair transfer: mod assist, min cues, HHA  Ambulation: Min assist with  BUE support of therapists (to/from bathroom, to/from sink at opposite side of room)  Static stance: Min assist with HHA  Time in stance: 1 min + 2 min + 1 min + 1 min     ADLs   Grooming: Min assist (pt washed face and brushed teeth with cues; OT provided shampoo cap and combed pt's hair)  UB dressing: Mod assist (pt assisted to don gown)  LB dressing: Dependent (to doff soiled brief and don new brief, anticipate pt could assist a bit with pulling up in stance but with assist for standing balance)  Toileting: Dependent (brief soiled with BM; pt ambulated to bathroom with assist, voided a bit on toilet then required dependent assist for thorough pericare. Placed new purwick at end of session for incontinence)    Activity Tolerance: Tolerated session well with c/o mild LUE pain and fatigue with standing activity    Patient Education: orientation, role of therapy, activity promotion, ADL engagement - verb understanding, needs reinforcement    Safety Devices in Place: left in chair, reclined, positioned well with pillows, needs in reach, alarm on, RN aware      Goals:  Short Term Goals  Time Frame for Short Term Goals: by discharge  Short Term Goal 1: Pt will perform toileting with min A - Not met  Short Term Goal 2: Pt will perform fx transfers with SBA - Not met  Short Term Goal 3: Pt will perform fx mobility to and from bathroom with SBA - Not met  Short Term Goal 4: Pt will perform UB dressing routine with min A - Not met         Plan:      Times Per Week: 2-5        If patient is discharged prior to next treatment, this note will serve as the discharge summary.      Therapy Time   Individual Concurrent Group Co-treatment   Time In 0930         Time Out 1010         Minutes 40          Timed Code Treatment Minutes: 40  Total Treatment Time: 40       Adrianna Dinero, OT

## 2024-04-26 NOTE — PROGRESS NOTES
D:  Patient is exiting bed, unassisted, is not re-directable, and has a history of falls.  She has a camera monitor; however, we do not have a sitter available.  Therefore, we need soft restraint orders to her Right Wrist and Left Leg, due to her history of falls and fracture to her left arm, for her safety.  Thank you!  A:  Notified provider, Trixie Salmeron NP.

## 2024-04-27 LAB
BACTERIA BLD CULT ORG #2: NORMAL
BACTERIA BLD CULT: NORMAL

## 2024-04-27 PROCEDURE — 1200000000 HC SEMI PRIVATE

## 2024-04-27 PROCEDURE — 6370000000 HC RX 637 (ALT 250 FOR IP): Performed by: INTERNAL MEDICINE

## 2024-04-27 PROCEDURE — 6370000000 HC RX 637 (ALT 250 FOR IP): Performed by: SURGERY

## 2024-04-27 PROCEDURE — 2580000003 HC RX 258: Performed by: INTERNAL MEDICINE

## 2024-04-27 PROCEDURE — 6360000002 HC RX W HCPCS: Performed by: INTERNAL MEDICINE

## 2024-04-27 RX ADMIN — SODIUM CHLORIDE, PRESERVATIVE FREE 10 ML: 5 INJECTION INTRAVENOUS at 21:56

## 2024-04-27 RX ADMIN — QUETIAPINE FUMARATE 25 MG: 25 TABLET ORAL at 21:56

## 2024-04-27 RX ADMIN — HEPARIN SODIUM 5000 UNITS: 5000 INJECTION INTRAVENOUS; SUBCUTANEOUS at 21:56

## 2024-04-27 RX ADMIN — MIRTAZAPINE 7.5 MG: 15 TABLET, FILM COATED ORAL at 21:56

## 2024-04-27 ASSESSMENT — PAIN SCALES - PAIN ASSESSMENT IN ADVANCED DEMENTIA (PAINAD)
FACIALEXPRESSION: SMILING OR INEXPRESSIVE
CONSOLABILITY: NO NEED TO CONSOLE
FACIALEXPRESSION: SMILING OR INEXPRESSIVE
FACIALEXPRESSION: SMILING OR INEXPRESSIVE
BODYLANGUAGE: RELAXED
BODYLANGUAGE: RELAXED
CONSOLABILITY: NO NEED TO CONSOLE
TOTALSCORE: 0
BREATHING: NORMAL
FACIALEXPRESSION: SMILING OR INEXPRESSIVE
CONSOLABILITY: NO NEED TO CONSOLE
BODYLANGUAGE: RELAXED
CONSOLABILITY: NO NEED TO CONSOLE
BREATHING: NORMAL
BODYLANGUAGE: RELAXED
BODYLANGUAGE: RELAXED
FACIALEXPRESSION: SMILING OR INEXPRESSIVE
CONSOLABILITY: NO NEED TO CONSOLE
TOTALSCORE: 0
CONSOLABILITY: NO NEED TO CONSOLE
TOTALSCORE: 0
BODYLANGUAGE: RELAXED
BREATHING: NORMAL
TOTALSCORE: 0
BREATHING: NORMAL
BREATHING: NORMAL
TOTALSCORE: 0
BREATHING: NORMAL
TOTALSCORE: 0
FACIALEXPRESSION: SMILING OR INEXPRESSIVE
CONSOLABILITY: NO NEED TO CONSOLE
BREATHING: NORMAL
BODYLANGUAGE: RELAXED
BREATHING: NORMAL
BREATHING: NORMAL
TOTALSCORE: 0
TOTALSCORE: 0
BODYLANGUAGE: RELAXED
CONSOLABILITY: NO NEED TO CONSOLE
BODYLANGUAGE: RELAXED
CONSOLABILITY: NO NEED TO CONSOLE
BODYLANGUAGE: RELAXED
FACIALEXPRESSION: SMILING OR INEXPRESSIVE
CONSOLABILITY: NO NEED TO CONSOLE
BREATHING: NORMAL
BREATHING: NORMAL
TOTALSCORE: 0
CONSOLABILITY: NO NEED TO CONSOLE
BREATHING: NORMAL
BODYLANGUAGE: RELAXED
TOTALSCORE: 0
BODYLANGUAGE: RELAXED
FACIALEXPRESSION: SMILING OR INEXPRESSIVE
CONSOLABILITY: NO NEED TO CONSOLE
TOTALSCORE: 0
BREATHING: NORMAL
FACIALEXPRESSION: SMILING OR INEXPRESSIVE
BODYLANGUAGE: RELAXED
FACIALEXPRESSION: SMILING OR INEXPRESSIVE
CONSOLABILITY: NO NEED TO CONSOLE
FACIALEXPRESSION: SMILING OR INEXPRESSIVE

## 2024-04-27 ASSESSMENT — PAIN SCALES - GENERAL
PAINLEVEL_OUTOF10: 0

## 2024-04-27 NOTE — PLAN OF CARE
Problem: Safety - Adult  Goal: Free from fall injury  Outcome: Progressing  Note:  Remains free from falls, bed in low position, call light in reach, bed alarm on for safety, will continue to monitor.     Problem: Pain  Goal: Verbalizes/displays adequate comfort level or baseline comfort level  Outcome: Progressing  Note:  PRN Tylenol 650 mg PO at 2147 helpful for generalized discomfort.

## 2024-04-27 NOTE — CARE COORDINATION
Case management is following for discharge planning. The chart was reviewed. A DC order is noted. Referrals were made to [facility] and [facility] by the  yesterday. Follow up calls and messages were left this afternoon. Waiting to hear back regarding a determination. [facility] pre-cert will be needed once a facility has accepted.    Candy Roman RN  [phone]

## 2024-04-27 NOTE — PLAN OF CARE
Problem: Safety - Adult  Goal: Free from fall injury  4/27/2024 1536 by Lyubov Fajardo RN  Outcome: Adequate for Discharge     Problem: ABCDS Injury Assessment  Goal: Absence of physical injury  Outcome: Adequate for Discharge     Problem: Skin/Tissue Integrity  Goal: Absence of new skin breakdown  Description: 1.  Monitor for areas of redness and/or skin breakdown  2.  Assess vascular access sites hourly  3.  Every 4-6 hours minimum:  Change oxygen saturation probe site  4.  Every 4-6 hours:  If on nasal continuous positive airway pressure, respiratory therapy assess nares and determine need for appliance change or resting period.  Outcome: Adequate for Discharge     Problem: Discharge Planning  Goal: Discharge to home or other facility with appropriate resources  Outcome: Adequate for Discharge     Problem: Chronic Conditions and Co-morbidities  Goal: Patient's chronic conditions and co-morbidity symptoms are monitored and maintained or improved  Outcome: Adequate for Discharge     Problem: Pain  Goal: Verbalizes/displays adequate comfort level or baseline comfort level  4/27/2024 1536 by Lyubov Fajardo RN  Outcome: Adequate for Discharge     Problem: Safety - Medical Restraint  Goal: Remains free of injury from restraints (Restraint for Interference with Medical Device)  Description: INTERVENTIONS:  1. Determine that other, less restrictive measures have been tried or would not be effective before applying the restraint  2. Evaluate the patient's condition at the time of restraint application  3. Inform patient/family regarding the reason for restraint  4. Q2H: Monitor safety, psychosocial status, comfort, nutrition and hydration  Outcome: Adequate for Discharge  Flowsheets  Taken 4/27/2024 1000 by Lyubov Fajardo RN  Remains free of injury from restraints (restraint for interference with medical device):   Determine that other, less restrictive measures have been tried or would not be effective before applying

## 2024-04-28 PROCEDURE — 1200000000 HC SEMI PRIVATE

## 2024-04-28 PROCEDURE — 6370000000 HC RX 637 (ALT 250 FOR IP): Performed by: SURGERY

## 2024-04-28 PROCEDURE — 6370000000 HC RX 637 (ALT 250 FOR IP): Performed by: INTERNAL MEDICINE

## 2024-04-28 PROCEDURE — 6360000002 HC RX W HCPCS: Performed by: SURGERY

## 2024-04-28 PROCEDURE — 6360000002 HC RX W HCPCS: Performed by: INTERNAL MEDICINE

## 2024-04-28 PROCEDURE — 2580000003 HC RX 258: Performed by: INTERNAL MEDICINE

## 2024-04-28 RX ORDER — HALOPERIDOL 5 MG/ML
2 INJECTION INTRAMUSCULAR EVERY 6 HOURS PRN
Status: DISCONTINUED | OUTPATIENT
Start: 2024-04-28 | End: 2024-05-02 | Stop reason: HOSPADM

## 2024-04-28 RX ADMIN — HEPARIN SODIUM 5000 UNITS: 5000 INJECTION INTRAVENOUS; SUBCUTANEOUS at 09:50

## 2024-04-28 RX ADMIN — MIRTAZAPINE 7.5 MG: 15 TABLET, FILM COATED ORAL at 21:06

## 2024-04-28 RX ADMIN — HEPARIN SODIUM 5000 UNITS: 5000 INJECTION INTRAVENOUS; SUBCUTANEOUS at 21:08

## 2024-04-28 RX ADMIN — SODIUM CHLORIDE, PRESERVATIVE FREE 10 ML: 5 INJECTION INTRAVENOUS at 09:51

## 2024-04-28 RX ADMIN — ATORVASTATIN CALCIUM 80 MG: 80 TABLET, FILM COATED ORAL at 09:50

## 2024-04-28 RX ADMIN — HALOPERIDOL LACTATE 2 MG: 5 INJECTION, SOLUTION INTRAMUSCULAR at 18:01

## 2024-04-28 RX ADMIN — CITALOPRAM HYDROBROMIDE 10 MG: 10 TABLET ORAL at 09:50

## 2024-04-28 RX ADMIN — SODIUM CHLORIDE, PRESERVATIVE FREE 10 ML: 5 INJECTION INTRAVENOUS at 21:06

## 2024-04-28 RX ADMIN — QUETIAPINE FUMARATE 25 MG: 25 TABLET ORAL at 21:06

## 2024-04-28 ASSESSMENT — PAIN SCALES - PAIN ASSESSMENT IN ADVANCED DEMENTIA (PAINAD)
TOTALSCORE: 0
CONSOLABILITY: NO NEED TO CONSOLE
CONSOLABILITY: NO NEED TO CONSOLE
TOTALSCORE: 0
BODYLANGUAGE: RELAXED
CONSOLABILITY: NO NEED TO CONSOLE
TOTALSCORE: 0
BODYLANGUAGE: RELAXED
BREATHING: NORMAL
TOTALSCORE: 0
FACIALEXPRESSION: SMILING OR INEXPRESSIVE
BREATHING: NORMAL
FACIALEXPRESSION: SMILING OR INEXPRESSIVE
BREATHING: NORMAL
FACIALEXPRESSION: SMILING OR INEXPRESSIVE
TOTALSCORE: 0
BODYLANGUAGE: RELAXED
FACIALEXPRESSION: SMILING OR INEXPRESSIVE
BREATHING: NORMAL
FACIALEXPRESSION: SMILING OR INEXPRESSIVE
TOTALSCORE: 0
FACIALEXPRESSION: SMILING OR INEXPRESSIVE
CONSOLABILITY: NO NEED TO CONSOLE
FACIALEXPRESSION: SMILING OR INEXPRESSIVE
BREATHING: NORMAL
FACIALEXPRESSION: SMILING OR INEXPRESSIVE
BODYLANGUAGE: RELAXED
BREATHING: NORMAL
FACIALEXPRESSION: SMILING OR INEXPRESSIVE
CONSOLABILITY: NO NEED TO CONSOLE
BODYLANGUAGE: RELAXED
TOTALSCORE: 0
BODYLANGUAGE: RELAXED
FACIALEXPRESSION: SMILING OR INEXPRESSIVE
CONSOLABILITY: NO NEED TO CONSOLE
TOTALSCORE: 0
TOTALSCORE: 0
FACIALEXPRESSION: SMILING OR INEXPRESSIVE
CONSOLABILITY: NO NEED TO CONSOLE
TOTALSCORE: 0
BREATHING: NORMAL
FACIALEXPRESSION: SMILING OR INEXPRESSIVE
TOTALSCORE: 0
CONSOLABILITY: NO NEED TO CONSOLE
BODYLANGUAGE: RELAXED
BREATHING: NORMAL
TOTALSCORE: 0
FACIALEXPRESSION: SMILING OR INEXPRESSIVE
BODYLANGUAGE: RELAXED
FACIALEXPRESSION: SMILING OR INEXPRESSIVE
TOTALSCORE: 0
BREATHING: NORMAL
CONSOLABILITY: NO NEED TO CONSOLE
CONSOLABILITY: NO NEED TO CONSOLE
BREATHING: NORMAL
TOTALSCORE: 0
BODYLANGUAGE: RELAXED
CONSOLABILITY: NO NEED TO CONSOLE
TOTALSCORE: 0
FACIALEXPRESSION: SMILING OR INEXPRESSIVE
CONSOLABILITY: NO NEED TO CONSOLE
BREATHING: NORMAL

## 2024-04-28 NOTE — CARE COORDINATION
Case management continues to follow for discharge planning. Referrals to [facility] and [facility] are still pending. Calls were left for admissions liaisons. Waiting for a determination. [facility] pre-cert needed once a facility has accepted. HOLDEN spoke with the pt's son, Michael, and daughter, Mary, regarding the barriers to DC today. Both expressed understanding.    Candy Roman, RN  [phone]

## 2024-04-28 NOTE — PLAN OF CARE
Problem: Safety - Adult  Goal: Free from fall injury  4/28/2024 1645 by Lyubov Fajardo RN  Outcome: Adequate for Discharge     Problem: ABCDS Injury Assessment  Goal: Absence of physical injury  4/28/2024 1645 by Lyubov Fajardo RN  Outcome: Adequate for Discharge     Problem: Skin/Tissue Integrity  Goal: Absence of new skin breakdown  Description: 1.  Monitor for areas of redness and/or skin breakdown  2.  Assess vascular access sites hourly  3.  Every 4-6 hours minimum:  Change oxygen saturation probe site  4.  Every 4-6 hours:  If on nasal continuous positive airway pressure, respiratory therapy assess nares and determine need for appliance change or resting period.  4/28/2024 1645 by Lyubov Fajardo RN  Outcome: Adequate for Discharge     Problem: Discharge Planning  Goal: Discharge to home or other facility with appropriate resources  4/28/2024 1645 by Lyubov Fajardo RN  Outcome: Adequate for Discharge     Problem: Chronic Conditions and Co-morbidities  Goal: Patient's chronic conditions and co-morbidity symptoms are monitored and maintained or improved  4/28/2024 1645 by Lyubov Fajardo RN  Outcome: Adequate for Discharge     Problem: Pain  Goal: Verbalizes/displays adequate comfort level or baseline comfort level  4/28/2024 1645 by Lyubov Fajardo RN  Outcome: Adequate for Discharge     Problem: Safety - Medical Restraint  Goal: Remains free of injury from restraints (Restraint for Interference with Medical Device)  Description: INTERVENTIONS:  1. Determine that other, less restrictive measures have been tried or would not be effective before applying the restraint  2. Evaluate the patient's condition at the time of restraint application  3. Inform patient/family regarding the reason for restraint  4. Q2H: Monitor safety, psychosocial status, comfort, nutrition and hydration  Outcome: Adequate for Discharge

## 2024-04-28 NOTE — PLAN OF CARE
Problem: Safety - Adult  Goal: Free from fall injury  Outcome: Progressing  Flowsheets (Taken 4/28/2024 0543)  Free From Fall Injury:   Instruct family/caregiver on patient safety   Based on caregiver fall risk screen, instruct family/caregiver to ask for assistance with transferring infant if caregiver noted to have fall risk factors     Problem: ABCDS Injury Assessment  Goal: Absence of physical injury  Outcome: Progressing     Problem: Skin/Tissue Integrity  Goal: Absence of new skin breakdown  Description: 1.  Monitor for areas of redness and/or skin breakdown  2.  Assess vascular access sites hourly  3.  Every 4-6 hours minimum:  Change oxygen saturation probe site  4.  Every 4-6 hours:  If on nasal continuous positive airway pressure, respiratory therapy assess nares and determine need for appliance change or resting period.  Outcome: Progressing     Problem: Discharge Planning  Goal: Discharge to home or other facility with appropriate resources  Outcome: Progressing  Flowsheets (Taken 4/28/2024 0543)  Discharge to home or other facility with appropriate resources:   Arrange for needed discharge resources and transportation as appropriate   Identify barriers to discharge with patient and caregiver   Identify discharge learning needs (meds, wound care, etc)   Arrange for interpreters to assist at discharge as needed   Refer to discharge planning if patient needs post-hospital services based on physician order or complex needs related to functional status, cognitive ability or social support system     Problem: Chronic Conditions and Co-morbidities  Goal: Patient's chronic conditions and co-morbidity symptoms are monitored and maintained or improved  Outcome: Progressing  Flowsheets (Taken 4/28/2024 0543)  Care Plan - Patient's Chronic Conditions and Co-Morbidity Symptoms are Monitored and Maintained or Improved:   Monitor and assess patient's chronic conditions and comorbid symptoms for stability,  deterioration, or improvement   Collaborate with multidisciplinary team to address chronic and comorbid conditions and prevent exacerbation or deterioration   Update acute care plan with appropriate goals if chronic or comorbid symptoms are exacerbated and prevent overall improvement and discharge     Problem: Pain  Goal: Verbalizes/displays adequate comfort level or baseline comfort level  Outcome: Progressing  Flowsheets (Taken 4/28/2024 2414)  Verbalizes/displays adequate comfort level or baseline comfort level:   Encourage patient to monitor pain and request assistance   Assess pain using appropriate pain scale   Administer analgesics based on type and severity of pain and evaluate response   Implement non-pharmacological measures as appropriate and evaluate response

## 2024-04-29 PROCEDURE — 6360000002 HC RX W HCPCS: Performed by: SURGERY

## 2024-04-29 PROCEDURE — 6370000000 HC RX 637 (ALT 250 FOR IP): Performed by: INTERNAL MEDICINE

## 2024-04-29 PROCEDURE — 97535 SELF CARE MNGMENT TRAINING: CPT

## 2024-04-29 PROCEDURE — 97530 THERAPEUTIC ACTIVITIES: CPT

## 2024-04-29 PROCEDURE — 1200000000 HC SEMI PRIVATE

## 2024-04-29 PROCEDURE — 92526 ORAL FUNCTION THERAPY: CPT

## 2024-04-29 PROCEDURE — 6370000000 HC RX 637 (ALT 250 FOR IP): Performed by: SURGERY

## 2024-04-29 PROCEDURE — 6360000002 HC RX W HCPCS: Performed by: INTERNAL MEDICINE

## 2024-04-29 PROCEDURE — 2580000003 HC RX 258: Performed by: INTERNAL MEDICINE

## 2024-04-29 RX ADMIN — MIRTAZAPINE 7.5 MG: 15 TABLET, FILM COATED ORAL at 20:36

## 2024-04-29 RX ADMIN — QUETIAPINE FUMARATE 25 MG: 25 TABLET ORAL at 20:36

## 2024-04-29 RX ADMIN — HALOPERIDOL LACTATE 2 MG: 5 INJECTION, SOLUTION INTRAMUSCULAR at 02:05

## 2024-04-29 RX ADMIN — ATORVASTATIN CALCIUM 80 MG: 80 TABLET, FILM COATED ORAL at 10:18

## 2024-04-29 RX ADMIN — SODIUM CHLORIDE, PRESERVATIVE FREE 10 ML: 5 INJECTION INTRAVENOUS at 20:36

## 2024-04-29 RX ADMIN — CITALOPRAM HYDROBROMIDE 10 MG: 10 TABLET ORAL at 10:18

## 2024-04-29 RX ADMIN — HEPARIN SODIUM 5000 UNITS: 5000 INJECTION INTRAVENOUS; SUBCUTANEOUS at 10:17

## 2024-04-29 RX ADMIN — SODIUM CHLORIDE, PRESERVATIVE FREE 10 ML: 5 INJECTION INTRAVENOUS at 10:17

## 2024-04-29 RX ADMIN — HEPARIN SODIUM 5000 UNITS: 5000 INJECTION INTRAVENOUS; SUBCUTANEOUS at 20:36

## 2024-04-29 ASSESSMENT — PAIN SCALES - PAIN ASSESSMENT IN ADVANCED DEMENTIA (PAINAD)
BREATHING: NORMAL
BREATHING: NORMAL
TOTALSCORE: 0
BREATHING: NORMAL
FACIALEXPRESSION: SMILING OR INEXPRESSIVE
BREATHING: NORMAL
FACIALEXPRESSION: SMILING OR INEXPRESSIVE
TOTALSCORE: 0
FACIALEXPRESSION: SMILING OR INEXPRESSIVE
TOTALSCORE: 0
BREATHING: NORMAL
BODYLANGUAGE: RELAXED
BREATHING: NORMAL
CONSOLABILITY: NO NEED TO CONSOLE
CONSOLABILITY: NO NEED TO CONSOLE
TOTALSCORE: 0
BODYLANGUAGE: RELAXED
FACIALEXPRESSION: SMILING OR INEXPRESSIVE
FACIALEXPRESSION: SMILING OR INEXPRESSIVE
BODYLANGUAGE: RELAXED
CONSOLABILITY: NO NEED TO CONSOLE
BODYLANGUAGE: RELAXED
BODYLANGUAGE: RELAXED
TOTALSCORE: 0
BREATHING: NORMAL
TOTALSCORE: 0
BODYLANGUAGE: RELAXED
BODYLANGUAGE: RELAXED
CONSOLABILITY: NO NEED TO CONSOLE
BODYLANGUAGE: RELAXED
CONSOLABILITY: NO NEED TO CONSOLE
BODYLANGUAGE: RELAXED
TOTALSCORE: 0
FACIALEXPRESSION: SMILING OR INEXPRESSIVE
TOTALSCORE: 0
FACIALEXPRESSION: SMILING OR INEXPRESSIVE
CONSOLABILITY: NO NEED TO CONSOLE
FACIALEXPRESSION: SMILING OR INEXPRESSIVE
TOTALSCORE: 0
FACIALEXPRESSION: SMILING OR INEXPRESSIVE
BODYLANGUAGE: RELAXED
TOTALSCORE: 0
FACIALEXPRESSION: SMILING OR INEXPRESSIVE
BREATHING: NORMAL
BREATHING: NORMAL
CONSOLABILITY: NO NEED TO CONSOLE
BREATHING: NORMAL
BODYLANGUAGE: RELAXED
CONSOLABILITY: NO NEED TO CONSOLE
FACIALEXPRESSION: SMILING OR INEXPRESSIVE
FACIALEXPRESSION: SMILING OR INEXPRESSIVE
TOTALSCORE: 0
CONSOLABILITY: NO NEED TO CONSOLE
BODYLANGUAGE: RELAXED
TOTALSCORE: 0

## 2024-04-29 NOTE — PROGRESS NOTES
Occupational Therapy  Facility/Department: [address]  Occupational Therapy Treatment     Name: Jennifer Lorenzana  : 1940  MRN: [id number]  Date of Service: 2024    Discharge Recommendations:  Subacute/Skilled Nursing Facility          Patient Diagnosis(es): The primary encounter diagnosis was Fall, initial encounter. Diagnoses of Deformity of left wrist and Closed fracture of distal end of left forearm, initial encounter were also pertinent to this visit.  Past Medical History:  has a past medical history of Cancer (HCC), Diverticulitis, and Unspecified cerebral artery occlusion with cerebral infarction.  Past Surgical History:  has a past surgical history that includes Inguinal hernia repair (2011); joint replacement; skin biopsy; and hip surgery (Left, 2023).    Treatment Diagnosis: decreased independence 2/2 NWB status on L UE      Assessment   Performance deficits / Impairments: Decreased functional mobility ;Decreased endurance;Decreased posture;Decreased coordination;Decreased ADL status;Decreased strength  Assessment: Pt is from home alone and has dementia at baseline. Pt is now below baseline 2/2 having 2 rib fxs, L UE wrist break with NWB status to L arm. Pt requiring Ax1-2 with fx mobility and transfers. Mobility bed to chair 2x rtn to be EOS. Assist with ADLs seated in chair. Limited by cognition. Will cont POC.  Treatment Diagnosis: decreased independence 2/2 NWB status on L UE  REQUIRES OT FOLLOW-UP: Yes  Activity Tolerance  Activity Tolerance: Treatment limited secondary to decreased cognition  Activity Tolerance Comments: .        Plan   Occupational Therapy Plan  Times Per Week: 2-5  Current Treatment Recommendations: Balance training, Self-Care / ADL, Pain management, Functional mobility training, Safety education & training, Endurance training, Cognitive reorientation, Strengthening, Gait training     Restrictions  Position Activity Restriction  Other position/activity  restrictions: NWB LUE; up as tolerated    Subjective   General  Chart Reviewed: Yes  Patient assessed for rehabilitation services?: Yes  Additional Pertinent Hx: 84 y.o. female with PMHx significant for dementia who presented to ED with the complaint of fall.  Patient has history of dementia.  Lives at home by herself however it has been reported that the patient is being monitored by camera.  Patient was found outside of her house in the yard laying on the ground.  It appeared that the patient had suffered a fall.  Patient was brought to the ED for further evaluation.  Patient was found to have left distal ulna and radius fracture.  Also she was found to have left third and fourth rib fractures.  Patient underwent closed reduction of the left wrist fracture with application of the splint. Pt is NWB in LUE  Referring Practitioner: Shruti Mitchell MD  Diagnosis: Sepsis  Subjective  Subjective: In bed agreeable to session, no pain rating     Social/Functional History  Social/Functional History  Lives With: Alone  Type of Home: House  Home Layout: One level  Home Access: Stairs to enter with rails (cant remember how many)  Bathroom Shower/Tub: Walk-in shower  Bathroom Toilet: Standard  Bathroom Equipment: Grab bars in shower, Shower chair  Has the patient had two or more falls in the past year or any fall with injury in the past year?: Yes  Receives Help From: Family (son checks on pt, has camera to keep an eye on pt)  ADL Assistance: Independent  Homemaking Assistance: Independent  Ambulation Assistance: Independent (no AD)  Transfer Assistance: Independent  Active : No  Patient's  Info: son  Leisure & Hobbies: being outside  Additional Comments: pt is poor historian with hx of dementia, above info may be inaccurate       Objective           Safety Devices  Type of Devices: Call light within reach;Gait belt;Nurse notified;Patient at risk for falls;Left in bed;Bed alarm in place  Bed Mobility

## 2024-04-29 NOTE — CARE COORDINATION
CM  spoke with  Jesus [phone] ( Cameron  has  list and  making  decisions  )  ,  pt's  children  and they are  reviewing list  to  provide  additional  SNF  referral  choices  ,  awaiting call back  .       Electronically signed by Maddie Vicente RN on 4/29/2024 at 4:33 PM     +++++++++++++++++++++++++++++++++++++++++++++++++++++      CM following for  d/c planning:    Patient  with noted  SNF referral      Cm  spoke with  Carissa in [facility] , [phone] who is reviewing and  will call back later  with determination:    [facility]  Skilled Nursing  [address] [address]  [phone]     CM  left   for Edita zahra Toma in admissions, [phone]  for [facility].      [facility]  Skilled Nursing  [address] [address]  [phone]     CM  recv'd  call back and  they are  still  negotiating  their  [employer]  contract  and  unfortunately can  not accept at this time .       Electronically signed by Maddie Vicente RN on 4/29/2024 at 9:04 AM       Maddie Vicente RN Case Manager  The [address]  [facility] [address] [phone]  Fax [phone]

## 2024-04-29 NOTE — PLAN OF CARE
Problem: Safety - Adult  Goal: Free from fall injury  4/29/2024 1841 by Laura Duncan RN  Outcome: Progressing  Note: Pt remains free of fall. Bed lowest position, bed alarm on, and call light within reach.      Problem: ABCDS Injury Assessment  Goal: Absence of physical injury  Outcome: Progressing  Flowsheets (Taken 4/29/2024 1841)  Absence of Physical Injury: Implement safety measures based on patient assessment     Problem: Skin/Tissue Integrity  Goal: Absence of new skin breakdown  Description: 1.  Monitor for areas of redness and/or skin breakdown  2.  Assess vascular access sites hourly  3.  Every 4-6 hours minimum:  Change oxygen saturation probe site  4.  Every 4-6 hours:  If on nasal continuous positive airway pressure, respiratory therapy assess nares and determine need for appliance change or resting period.  Outcome: Progressing  Note: Repositioning pt frequently.      Problem: Discharge Planning  Goal: Discharge to home or other facility with appropriate resources  Outcome: Progressing  Flowsheets (Taken 4/28/2024 0543 by Diane Marie, RN)  Discharge to home or other facility with appropriate resources:   Arrange for needed discharge resources and transportation as appropriate   Identify barriers to discharge with patient and caregiver   Identify discharge learning needs (meds, wound care, etc)   Arrange for interpreters to assist at discharge as needed   Refer to discharge planning if patient needs post-hospital services based on physician order or complex needs related to functional status, cognitive ability or social support system     Problem: Chronic Conditions and Co-morbidities  Goal: Patient's chronic conditions and co-morbidity symptoms are monitored and maintained or improved  Outcome: Progressing  Flowsheets (Taken 4/28/2024 0543 by Diane Marie, RN)  Care Plan - Patient's Chronic Conditions and Co-Morbidity Symptoms are Monitored and Maintained or Improved:   Monitor and assess patient's

## 2024-04-29 NOTE — PROGRESS NOTES
Speech Language Pathology  Facility/Department:[address] TELEMETRY  Dysphagia tx/dc    Name: Jennifer Lorenzana  : 1940  MRN: [id number]                                                    Patient Diagnosis(es):   Patient Active Problem List    Diagnosis Date Noted    Sepsis (HCC) 2024    GI bleed 06/15/2023    Closed displaced intertrochanteric fracture of left femur (HCC) 2023    Incarcerated femoral hernia 2011     Past Medical History:   Diagnosis Date    Cancer (HCC)     Diverticulitis     Unspecified cerebral artery occlusion with cerebral infarction      Past Surgical History:   Procedure Laterality Date    HIP SURGERY Left 2023    LEFT HIP GAMMA NAILING performed by Phillip Gonzalez MD at [facility] OR    INGUINAL HERNIA REPAIR  2011    HERNIA INGUINAL REPAIR LEFT WITH POSSIBLE SMALL BOWEL    JOINT REPLACEMENT      SKIN BIOPSY       History of Present Illness  Per MD notes:  \" 84 y.o. female with PMHx significant for dementia who presented to ED with the complaint of fall.  Patient has history of dementia.  Lives at home by herself however it has been reported that the patient is being monitored by camera.  Patient was found outside of her house in the yard laying on the ground.  It appeared that the patient had suffered a fall.  Patient was brought to the ED for further evaluation.  Patient was found to have left distal ulna and radius fracture.  Also she was found to have left third and fourth rib fractures.  Patient underwent closed reduction of the left wrist fracture with application of the splint.  Labs were remarkable for elevated lactate of 5.0 which trended down to 2.8.  UA is still pending.  Patient was given IV Rocephin empirically by the ED provider.  She is not capable of providing any meaningful history due to underlying dementia.  Patient is currently being admitted under inpatient status for further management and evaluation. \"    Imaging  XR PELVIS (1-2 VIEWS)    none    Bedside Swallowing Evaluation Impression 4/26/24  Pt alert, oriented to person and place. Pt  followed commands and answered basic questions appropriately.  Oral structures grossly intact, no asymmetry noted.  Dentition adequate.  Pt analyzed with pudding, dajuan crackers and thin liquids via cup / straw. Pt demonstrated no overt signs of aspiration: no coughing/throat clearing or change in vocal quality.  Swallow movement noted upon palpation of anterior neck. Adequate labial seal noted with no anterior loss of liquids.  Pt exhibited no difficulty with mastication of cracker, no oral residue.    Instrumental assessment results- not indicated    Prognosis:  Prognosis for improvement: fair  Barriers to reach goals: age and co-morbidities    Treatment:  Dysphagia Goals:  The pt will be seen  1-2 x to address the following goals:   Goal 1: Patient will tolerate least restrictive diet with adequate oral prep and without overt signs of aspiration or associated decline in respiratory status.  4/2/9: pt somewhat lethargic, PCA states pt was up during the night. Pt did awaken to verbal stim and accepting of PO trials. Pt consumed pudding, diced peaches and water.  No anterior loss of bolus, no pocketing of material. Single cough out of multiple trials of thin occurred, however appeared pt still had not swallowed prior bite. No additional cough/throat clearing occurred. Swallow movement observed upon visualization of anterior neck. No respiratory decline per chart review or concerns reported per RN  Goal met  Goal 2: Patient/caregiver will demonstrate understanding of dysphagia recommendations/concerns.  4/26: Educated pt to purpose of visit, s/s of aspiration, concern if aspiration occurs and rationale for diet recommendation/strategies to reduce risk for aspiration. Pt instructed to notify staff if any signs of aspiration emerge or dysphagia concerns. Pt did not state comprehension, family not present  Cont  goal  4/29: pt educated to purpose of visit, does not indicate comprehension at this time  Goal not met    Education  Please see above.    Total treatment time: 15 tx    Plan:   Recommended Diet: cont soft/bite sized /thin liquids  Medication administration: as jayson    Strategies:   Upright 90 degrees  Discharge Recommendation: no follow up indicated  Discussed with Laura ZURITA  Needs met prior to leaving room, call light within reach    TRACI LAMBERT M.S./CCC-SLP #[id number]  Pg. # [phone]

## 2024-04-29 NOTE — PROGRESS NOTES
Orthopedic Surgery   Progress Note      SUBJECTIVE:  Patient resting comfortably. No events reported recently in last several days. No additional complaints reported by patient currently      OBJECTIVE:  /65   Pulse 76   Temp 98.1 °F (36.7 °C) (Oral)   Resp 19   Ht 1.651 m (5' 5\")   Wt 45.5 kg (100 lb 5 oz)   SpO2 92%   BMI 16.69 kg/m²   awake, alert, cooperative, calm alert and oriented x 1  MUSCULOSKELETAL:  there is no redness, warmth, or swelling of the joints  full range of motion noted  motor strength is 5 out of 5 all extremities bilaterally  tone is normal  with exception of     Left upper extremity:  Splint and padding  gently loosened including at ulnar fracture to evaluate skin with no evidence of obvious skin breaks or skin tenting, diffuse ecchymosis with thin fragile skin throughout the wrist and forearm. No evidence of skin breakdown at proximal aspect of splint   Well-perfused digits noted  Limited motor and sensory exam secondary to patient mental status patient does demonstrate active flexion and extension of fingers and thumb gently with moderate hand swelling  Brisk capillary refill all fingers with palpable radial pulse  Compartments soft and compressible throughout hand and forearm    CBC:   Lab Results   Component Value Date/Time    WBC 14.6 04/23/2024 01:58 AM    RBC 5.07 04/23/2024 01:58 AM    HGB 13.8 04/23/2024 01:58 AM    HCT 42.3 04/23/2024 01:58 AM    MCV 83.5 04/23/2024 01:58 AM    MCH 27.3 04/23/2024 01:58 AM    MCHC 32.7 04/23/2024 01:58 AM    RDW 16.0 04/23/2024 01:58 AM     04/23/2024 01:58 AM    MPV 7.1 04/23/2024 01:58 AM     Hemoglobin/Hematocrit:    Lab Results   Component Value Date/Time    HGB 13.8 04/23/2024 01:58 AM    HCT 42.3 04/23/2024 01:58 AM     PT/INR:    Lab Results   Component Value Date/Time    PROTIME 14.7 06/15/2023 05:03 PM    INR 1.15 06/15/2023 05:03 PM     Chem Basic:   Lab Results   Component Value Date/Time     04/25/2024 05:09

## 2024-04-29 NOTE — PROGRESS NOTES
Physical Therapy    Daily Treatment Note    Discharge Recommendations:  Subacute/Skilled Nursing Facility  Equipment Needs:  Defer    Assessment:  Pt with decreased transfers and gait today.  Standing balance is poor with posterior lean.  Gait is unsteady with shuffled steps.  Pt limited by cognition.  Plan is for continued PT at SNF to increase transfers and gait back to functional baseline.  IF pt returns home, pt will need 24hr capable assist and home PT to ensure safety.    Chart Reviewed: Yes   Other position/activity restrictions: NWB LUE; up as tolerated   Additional Pertinent Hx: 84 y.o. female with PMHx significant for dementia who presented to ED with the complaint of fall, found down outside by neighbors. Patient was found to have left distal ulna and radius fracture.  Also she was found to have left third and fourth rib fractures.  Patient underwent closed reduction of the left wrist fracture with application of the splint, now NWB LUE. Also admitted for possible sepsis, UTI.      Diagnosis: sepsis   Treatment Diagnosis: dec functional mobility      Subjective:  Pt found supine in bed with sitter in room.  Pt confused, asking many times for her son.  Once in chair, pt with multiple attempts to return back to bed.     Pain:  Not able to report due to cognition, pt does not appear to be in pain.       Objective:    Bed mobility  Supine to sit:  Mod A x2 (HOB raised, verbal cues, cues to not push through L UE)  Sit to Supine:  Mod A x1 (HOB flat, cues to not push through L UE, assist for LEs)    Transfers  Sit to stand:  Mod A (to hand held support from bed and chair)  Stand to sit:  Mod A (decreased control to sit)  Bed <> chair:  Mod A x2 (stand step pivot, posterior lean, hand held support)    Ambulation  Assistance Level:  Mod A x2  Assistive device:  Hand held support R side (L forearm support through elbow)  Distance:  2-3 steps twice  Quality of gait:  shuffled steps, posterior lean, unsteady

## 2024-04-30 PROCEDURE — 6370000000 HC RX 637 (ALT 250 FOR IP): Performed by: SURGERY

## 2024-04-30 PROCEDURE — 2580000003 HC RX 258: Performed by: INTERNAL MEDICINE

## 2024-04-30 PROCEDURE — 1200000000 HC SEMI PRIVATE

## 2024-04-30 PROCEDURE — 6360000002 HC RX W HCPCS: Performed by: INTERNAL MEDICINE

## 2024-04-30 PROCEDURE — 6370000000 HC RX 637 (ALT 250 FOR IP): Performed by: INTERNAL MEDICINE

## 2024-04-30 RX ADMIN — ATORVASTATIN CALCIUM 80 MG: 80 TABLET, FILM COATED ORAL at 10:07

## 2024-04-30 RX ADMIN — ONDANSETRON 4 MG: 2 INJECTION INTRAMUSCULAR; INTRAVENOUS at 22:24

## 2024-04-30 RX ADMIN — SODIUM CHLORIDE, PRESERVATIVE FREE 10 ML: 5 INJECTION INTRAVENOUS at 21:10

## 2024-04-30 RX ADMIN — HEPARIN SODIUM 5000 UNITS: 5000 INJECTION INTRAVENOUS; SUBCUTANEOUS at 21:05

## 2024-04-30 RX ADMIN — CITALOPRAM HYDROBROMIDE 10 MG: 10 TABLET ORAL at 10:07

## 2024-04-30 RX ADMIN — HEPARIN SODIUM 5000 UNITS: 5000 INJECTION INTRAVENOUS; SUBCUTANEOUS at 10:07

## 2024-04-30 RX ADMIN — QUETIAPINE FUMARATE 25 MG: 25 TABLET ORAL at 21:05

## 2024-04-30 RX ADMIN — ACETAMINOPHEN 650 MG: 325 TABLET ORAL at 21:05

## 2024-04-30 RX ADMIN — SODIUM CHLORIDE, PRESERVATIVE FREE 10 ML: 5 INJECTION INTRAVENOUS at 10:07

## 2024-04-30 RX ADMIN — Medication 5 MG: at 21:05

## 2024-04-30 RX ADMIN — MIRTAZAPINE 7.5 MG: 15 TABLET, FILM COATED ORAL at 21:05

## 2024-04-30 ASSESSMENT — PAIN SCALES - PAIN ASSESSMENT IN ADVANCED DEMENTIA (PAINAD)
CONSOLABILITY: NO NEED TO CONSOLE
TOTALSCORE: 0
FACIALEXPRESSION: SMILING OR INEXPRESSIVE
TOTALSCORE: 0
BODYLANGUAGE: RELAXED
BODYLANGUAGE: RELAXED
BREATHING: NORMAL
BODYLANGUAGE: RELAXED
TOTALSCORE: 0
TOTALSCORE: 0
FACIALEXPRESSION: SMILING OR INEXPRESSIVE
CONSOLABILITY: NO NEED TO CONSOLE
BODYLANGUAGE: RELAXED
CONSOLABILITY: NO NEED TO CONSOLE
FACIALEXPRESSION: SMILING OR INEXPRESSIVE
CONSOLABILITY: NO NEED TO CONSOLE
BODYLANGUAGE: RELAXED
CONSOLABILITY: NO NEED TO CONSOLE
FACIALEXPRESSION: SMILING OR INEXPRESSIVE
BODYLANGUAGE: RELAXED
TOTALSCORE: 0
BODYLANGUAGE: RELAXED
BODYLANGUAGE: RELAXED
TOTALSCORE: 0
FACIALEXPRESSION: SMILING OR INEXPRESSIVE
TOTALSCORE: 0
CONSOLABILITY: NO NEED TO CONSOLE
CONSOLABILITY: NO NEED TO CONSOLE
BREATHING: NORMAL
FACIALEXPRESSION: SMILING OR INEXPRESSIVE
FACIALEXPRESSION: SMILING OR INEXPRESSIVE
CONSOLABILITY: NO NEED TO CONSOLE
TOTALSCORE: 0
BREATHING: NORMAL
BODYLANGUAGE: RELAXED
FACIALEXPRESSION: SMILING OR INEXPRESSIVE
TOTALSCORE: 0
FACIALEXPRESSION: SMILING OR INEXPRESSIVE
BREATHING: NORMAL
CONSOLABILITY: NO NEED TO CONSOLE
BREATHING: NORMAL
FACIALEXPRESSION: SMILING OR INEXPRESSIVE
TOTALSCORE: 0
BREATHING: NORMAL
BREATHING: NORMAL
CONSOLABILITY: NO NEED TO CONSOLE
TOTALSCORE: 0
BREATHING: NORMAL
CONSOLABILITY: NO NEED TO CONSOLE
CONSOLABILITY: NO NEED TO CONSOLE
BREATHING: NORMAL
TOTALSCORE: 0
BREATHING: NORMAL
BODYLANGUAGE: RELAXED

## 2024-04-30 NOTE — CARE COORDINATION
CM  reached  back out to  Cameron to  update her  on referral status:  and  asked for  add'l SNF choices  she  is  frustrated  with the  limited  amount of  choices  in her  network  and  state a lot of them are \"1\" 's and  on the  Medicare  watch board.      She provided ,  1.)  Perfectomarlon Nai:  Adriana Patel:  [phone].  2.)  Nalini Urias  for  referrals: [facility] [phone]    Referrals  faxed. CM  spoke with  Admissions liaisons  who are reviewing and  will call CM  back with determination .    Electronically signed by Maddie Vicente RN on 4/30/2024 at 2:09 PM     +++++++++++++++++++++++++++++++++++++++++++++     CM following for  d/c planning ,  previous requested referrals  wer  unable to accept the pt  and  dgtr  Cameron  provided  add'l referrals  In  [place]    1.) Renetta Andrews : no answer unable to leave      2. [facility]: P:  [phone]  Fax: [phone].   3.) Sohail Albarado :  left  VM  awaiting call back .     Referrals  faxed  .    Electronically signed by Maddie Vicente RN on 4/30/2024 at 1:36 PM         Maddie Vicente RN Case Manager  [facility]  [address]  [place] [address]  [phone]  Fax [phone]

## 2024-04-30 NOTE — PROGRESS NOTES
V2.0    [facility] [place]      Name:  Jennifer Lorenzana /Age/Sex: 1940  (84 y.o. female)   MRN & CSN:  [id number] & [id number] Encounter Date/Time: 2024 7:06 PM EDT   Location:  63/6313-01 PCP: Michelle Jamil MD     Attending:Brigido Gonzalez MD       Hospital Day: 8    Assessment and Recommendations       Assessment/Plan:     Dementia with delirium:  Patient currently on Seroquel at hour sleep  Currently requiring sitter  Behavior has been stable in discussion with staff but still needs sitter    Left radial/ulnar fracture status post reduction and splinting:  Conservative management per family wishes  Patient will follow-up with orthopedics in approximately 1 week    Sepsis of unknown origin was ruled out    Type II NSTEMI    Disposition: Discussed with case management awaiting approval      Diet ADULT DIET; Dysphagia - Soft and Bite Sized; Low Fat/Low Chol/High Fiber/ALEXIA   DVT Prophylaxis [] Lovenox, []  Heparin, [] SCDs, [] Ambulation,  [] Eliquis, [] Xarelto  [] Coumadin   Code Status Full Code       Surrogate Decision Maker/ POA      Personally reviewed Lab Studies and Imaging             Subjective:     Chief Complaint: Increased agitation    84 y.o. female with PMHx significant for dementia who presented to ED with the complaint of fall. Patient has history of dementia. Lives at home by herself however it has been reported that the patient is being monitored by camera. Patient was found outside of her house in the yard laying on the ground. It appeared that the patient had suffered a fall. Patient was brought to the ED for further evaluation. Patient was found to have left distal ulna and radius fracture. Also she was found to have left third and fourth rib fractures. Patient underwent closed reduction of the left wrist fracture with application of the splint. Labs were remarkable for elevated lactate of 5.0 which trended down to 2.8. UA is still pending. Patient was given IV  Rocephin empirically by the ED provider. She is not capable of providing any meaningful history due to underlying dementia. Patient is currently being admitted under inpatient status for further management and evaluation.     Subjective: Nursing staff reports no acute issues other than needing a sitter.  Patient is confused lying in bed without complaints      Review of Systems:      Pertinent positives and negatives discussed in HPI    Objective:     Intake/Output Summary (Last 24 hours) at 4/30/2024 1906  Last data filed at 4/30/2024 0433  Gross per 24 hour   Intake 10 ml   Output 100 ml   Net -90 ml      Vitals:   Vitals:    04/30/24 0433 04/30/24 0758 04/30/24 1252 04/30/24 1618   BP: 134/81 127/75 115/69 133/72   Pulse: 82 86 83 94   Resp: 16 22 22 21   Temp: 98.3 °F (36.8 °C) 98.1 °F (36.7 °C) 98.3 °F (36.8 °C) 98.2 °F (36.8 °C)   TempSrc: Oral Oral Oral Oral   SpO2: 93% 93% 93% 92%   Weight:       Height:             Physical Exam:      General: NAD   Confused elderly female resting in bed  Eyes: Clear nonicteric   ENT: neck supple trach midline  Cardiovascular: Regular rate.  Respiratory: Clear to auscultation  Gastrointestinal: Soft, non tender  BS Positive  Genitourinary: no suprapubic tenderness  Musculoskeletal:  edema none  Skin: warm, dry  Neuro: Alert and confused  Psych: Mood appropriate.         Medications:   Medications:    QUEtiapine  25 mg Oral Nightly    atorvastatin  80 mg Oral Daily    citalopram  10 mg Oral Daily    mirtazapine  7.5 mg Oral Nightly    sodium chloride flush  5-40 mL IntraVENous 2 times per day    heparin (porcine)  5,000 Units SubCUTAneous BID      Infusions:    sodium chloride 5 mL/hr at 04/23/24 2202     PRN Meds: haloperidol lactate, 2 mg, Q6H PRN  acetaminophen, 650 mg, Q6H PRN  melatonin, 5 mg, Nightly PRN  sodium chloride flush, 5-40 mL, PRN  sodium chloride, , PRN  ondansetron, 4 mg, Q8H PRN   Or  ondansetron, 4 mg, Q6H PRN  polyethylene glycol, 17 g, Daily

## 2024-04-30 NOTE — PLAN OF CARE
Problem: ABCDS Injury Assessment  Goal: Absence of physical injury  Outcome: Progressing  Flowsheets (Taken 4/29/2024 1841)  Absence of Physical Injury: Implement safety measures based on patient assessment     Problem: Skin/Tissue Integrity  Goal: Absence of new skin breakdown  Description: 1.  Monitor for areas of redness and/or skin breakdown  2.  Assess vascular access sites hourly  3.  Every 4-6 hours minimum:  Change oxygen saturation probe site  4.  Every 4-6 hours:  If on nasal continuous positive airway pressure, respiratory therapy assess nares and determine need for appliance change or resting period.  Outcome: Progressing  Note: Repositioned pt frequently.     Problem: Discharge Planning  Goal: Discharge to home or other facility with appropriate resources  Outcome: Progressing  Flowsheets (Taken 4/29/2024 2319 by Yael Sánchez RN)  Discharge to home or other facility with appropriate resources:   Identify barriers to discharge with patient and caregiver   Arrange for needed discharge resources and transportation as appropriate   Identify discharge learning needs (meds, wound care, etc)   Arrange for interpreters to assist at discharge as needed   Refer to discharge planning if patient needs post-hospital services based on physician order or complex needs related to functional status, cognitive ability or social support system     Problem: Chronic Conditions and Co-morbidities  Goal: Patient's chronic conditions and co-morbidity symptoms are monitored and maintained or improved  Outcome: Progressing  Flowsheets (Taken 4/29/2024 2319 by Yael Sánchez RN)  Care Plan - Patient's Chronic Conditions and Co-Morbidity Symptoms are Monitored and Maintained or Improved:   Monitor and assess patient's chronic conditions and comorbid symptoms for stability, deterioration, or improvement   Collaborate with multidisciplinary team to address chronic and comorbid conditions and prevent

## 2024-04-30 NOTE — CARE COORDINATION
CM met with pt  . ( Family son Michael) who confirmed they are aware of Appeal  that was  up help , an  agreeable to  d/c  He signed  HINN 12  and  placed  in chart  ,  copy provided  to him as  well.      Electronically signed by Maddie Vicente RN on 4/30/2024 at 3:04 PM

## 2024-04-30 NOTE — PLAN OF CARE
Problem: Safety - Adult  Goal: Free from fall injury  4/29/2024 2319 by Yael Sánchez RN  Outcome: Progressing  Flowsheets (Taken 4/29/2024 2319)  Free From Fall Injury:   Instruct family/caregiver on patient safety   Based on caregiver fall risk screen, instruct family/caregiver to ask for assistance with transferring infant if caregiver noted to have fall risk factors  Note: Patient is free from fall. Ensure all safety measures are in place. Bed lock in lowest position, bed alarm on, gripper sock worn by patient, bedside table with patient's belongings and call light within patient reach.     Problem: Chronic Conditions and Co-morbidities  Goal: Patient's chronic conditions and co-morbidity symptoms are monitored and maintained or improved  4/29/2024 2319 by Yael Sánchez RN  Outcome: Progressing  Flowsheets (Taken 4/29/2024 2319)  Care Plan - Patient's Chronic Conditions and Co-Morbidity Symptoms are Monitored and Maintained or Improved:   Monitor and assess patient's chronic conditions and comorbid symptoms for stability, deterioration, or improvement   Collaborate with multidisciplinary team to address chronic and comorbid conditions and prevent exacerbation or deterioration   Update acute care plan with appropriate goals if chronic or comorbid symptoms are exacerbated and prevent overall improvement and discharge  4/29/2024 1841 by Laura Duncan, RN  Outcome: Progressing  Flowsheets (Taken 4/28/2024 0543 by Diane Marie, RN)  Care Plan - Patient's Chronic Conditions and Co-Morbidity Symptoms are Monitored and Maintained or Improved:   Monitor and assess patient's chronic conditions and comorbid symptoms for stability, deterioration, or improvement   Collaborate with multidisciplinary team to address chronic and comorbid conditions and prevent exacerbation or deterioration   Update acute care plan with appropriate goals if chronic or comorbid symptoms are exacerbated and prevent overall  improvement and discharge     Problem: Discharge Planning  Goal: Discharge to home or other facility with appropriate resources  4/29/2024 2319 by Yael Sánchez RN  Outcome: Progressing  Flowsheets (Taken 4/29/2024 2319)  Discharge to home or other facility with appropriate resources:   Identify barriers to discharge with patient and caregiver   Arrange for needed discharge resources and transportation as appropriate   Identify discharge learning needs (meds, wound care, etc)   Arrange for interpreters to assist at discharge as needed   Refer to discharge planning if patient needs post-hospital services based on physician order or complex needs related to functional status, cognitive ability or social support system

## 2024-05-01 PROCEDURE — 6370000000 HC RX 637 (ALT 250 FOR IP): Performed by: INTERNAL MEDICINE

## 2024-05-01 PROCEDURE — 1200000000 HC SEMI PRIVATE

## 2024-05-01 PROCEDURE — 97530 THERAPEUTIC ACTIVITIES: CPT

## 2024-05-01 PROCEDURE — 2580000003 HC RX 258: Performed by: INTERNAL MEDICINE

## 2024-05-01 PROCEDURE — 6360000002 HC RX W HCPCS: Performed by: INTERNAL MEDICINE

## 2024-05-01 PROCEDURE — 6370000000 HC RX 637 (ALT 250 FOR IP): Performed by: SURGERY

## 2024-05-01 PROCEDURE — 97116 GAIT TRAINING THERAPY: CPT

## 2024-05-01 RX ADMIN — HEPARIN SODIUM 5000 UNITS: 5000 INJECTION INTRAVENOUS; SUBCUTANEOUS at 21:26

## 2024-05-01 RX ADMIN — CITALOPRAM HYDROBROMIDE 10 MG: 10 TABLET ORAL at 09:54

## 2024-05-01 RX ADMIN — MIRTAZAPINE 7.5 MG: 15 TABLET, FILM COATED ORAL at 21:26

## 2024-05-01 RX ADMIN — HEPARIN SODIUM 5000 UNITS: 5000 INJECTION INTRAVENOUS; SUBCUTANEOUS at 09:54

## 2024-05-01 RX ADMIN — QUETIAPINE FUMARATE 25 MG: 25 TABLET ORAL at 21:26

## 2024-05-01 RX ADMIN — ATORVASTATIN CALCIUM 80 MG: 80 TABLET, FILM COATED ORAL at 09:54

## 2024-05-01 RX ADMIN — SODIUM CHLORIDE, PRESERVATIVE FREE 10 ML: 5 INJECTION INTRAVENOUS at 09:58

## 2024-05-01 RX ADMIN — ACETAMINOPHEN 650 MG: 325 TABLET ORAL at 21:25

## 2024-05-01 RX ADMIN — SODIUM CHLORIDE, PRESERVATIVE FREE 10 ML: 5 INJECTION INTRAVENOUS at 21:29

## 2024-05-01 ASSESSMENT — PAIN SCALES - PAIN ASSESSMENT IN ADVANCED DEMENTIA (PAINAD)
BREATHING: NORMAL
BODYLANGUAGE: RELAXED
BODYLANGUAGE: RELAXED
TOTALSCORE: 0
BREATHING: NORMAL
CONSOLABILITY: NO NEED TO CONSOLE
FACIALEXPRESSION: SMILING OR INEXPRESSIVE
TOTALSCORE: 0
CONSOLABILITY: NO NEED TO CONSOLE
BODYLANGUAGE: RELAXED
BODYLANGUAGE: RELAXED
BREATHING: NORMAL
TOTALSCORE: 0
BODYLANGUAGE: RELAXED
FACIALEXPRESSION: SMILING OR INEXPRESSIVE
FACIALEXPRESSION: SMILING OR INEXPRESSIVE
CONSOLABILITY: NO NEED TO CONSOLE
BODYLANGUAGE: TENSE, DISTRESSED PACING, FIDGETING
BODYLANGUAGE: RELAXED
FACIALEXPRESSION: SMILING OR INEXPRESSIVE
TOTALSCORE: 0
CONSOLABILITY: NO NEED TO CONSOLE
FACIALEXPRESSION: SMILING OR INEXPRESSIVE
FACIALEXPRESSION: SMILING OR INEXPRESSIVE
BREATHING: NORMAL
CONSOLABILITY: NO NEED TO CONSOLE
CONSOLABILITY: NO NEED TO CONSOLE
BREATHING: NORMAL
FACIALEXPRESSION: SMILING OR INEXPRESSIVE
CONSOLABILITY: NO NEED TO CONSOLE
BREATHING: NORMAL
TOTALSCORE: 0
CONSOLABILITY: NO NEED TO CONSOLE
TOTALSCORE: 0
FACIALEXPRESSION: SMILING OR INEXPRESSIVE
TOTALSCORE: 0
TOTALSCORE: 0
CONSOLABILITY: UNABLE TO CONSOLE, DISTRACT OR REASSURE
CONSOLABILITY: NO NEED TO CONSOLE
BREATHING: NORMAL
TOTALSCORE: 0
FACIALEXPRESSION: SMILING OR INEXPRESSIVE
BREATHING: NORMAL
TOTALSCORE: 3
BODYLANGUAGE: RELAXED
BREATHING: NORMAL
BODYLANGUAGE: RELAXED
CONSOLABILITY: NO NEED TO CONSOLE
FACIALEXPRESSION: SMILING OR INEXPRESSIVE
FACIALEXPRESSION: SMILING OR INEXPRESSIVE
BODYLANGUAGE: RELAXED
BODYLANGUAGE: RELAXED
FACIALEXPRESSION: SMILING OR INEXPRESSIVE
BODYLANGUAGE: RELAXED
CONSOLABILITY: NO NEED TO CONSOLE

## 2024-05-01 ASSESSMENT — PAIN SCALES - WONG BAKER
WONGBAKER_NUMERICALRESPONSE: HURTS A LITTLE BIT
WONGBAKER_NUMERICALRESPONSE: 2;0

## 2024-05-01 NOTE — PLAN OF CARE
Problem: Safety - Adult  Goal: Free from fall injury  5/1/2024 1037 by Veronika Carrion RN  Outcome: Progressing   Safety precaution in place call light in reach, PCA at bedside will [place] to monitor.  Problem: ABCDS Injury Assessment  Goal: Absence of physical injury  5/1/2024 1037 by Veronika Carrion RN  Outcome: Progressing     Problem: Skin/Tissue Integrity  Goal: Absence of new skin breakdown  Description: 1.  Monitor for areas of redness and/or skin breakdown  2.  Assess vascular access sites hourly  3.  Every 4-6 hours minimum:  Change oxygen saturation probe site  4.  Every 4-6 hours:  If on nasal continuous positive airway pressure, respiratory therapy assess nares and determine need for appliance change or resting period.  5/1/2024 1037 by Veronika Carrion RN  Outcome: Progressing     Problem: Discharge Planning  Goal: Discharge to home or other facility with appropriate resources  5/1/2024 1037 by Veronika Carrion RN  Outcome: Progressing     Problem: Pain  Goal: Verbalizes/displays adequate comfort level or baseline comfort level  Outcome: Progressing   Patient free from pain or discomfort this shift will [place] to monitor.

## 2024-05-01 NOTE — PLAN OF CARE
Problem: Safety - Adult  Goal: Free from fall injury  5/1/2024 0220 by Yael Sánchez RN  Outcome: Progressing  Flowsheets (Taken 5/1/2024 0220)  Free From Fall Injury: Based on caregiver fall risk screen, instruct family/caregiver to ask for assistance with transferring infant if caregiver noted to have fall risk factors     Problem: ABCDS Injury Assessment  Goal: Absence of physical injury  5/1/2024 0220 by Yael Sánchez RN  Outcome: Progressing  Flowsheets (Taken 5/1/2024 0220)  Absence of Physical Injury: Implement safety measures based on patient assessment     Problem: Skin/Tissue Integrity  Goal: Absence of new skin breakdown  Description: 1.  Monitor for areas of redness and/or skin breakdown  2.  Assess vascular access sites hourly  3.  Every 4-6 hours minimum:  Change oxygen saturation probe site  4.  Every 4-6 hours:  If on nasal continuous positive airway pressure, respiratory therapy assess nares and determine need for appliance change or resting period.  5/1/2024 0220 by Yael Sánchez RN  Outcome: Progressing  Note: Patient is free from any skin breakdown. Assist patient to turn to sides every 2 hours and ensure diaper is dry and clean all the time     Problem: Chronic Conditions and Co-morbidities  Goal: Patient's chronic conditions and co-morbidity symptoms are monitored and maintained or improved  5/1/2024 0220 by Yael Sánchez RN  Outcome: Progressing  Flowsheets (Taken 5/1/2024 0220)  Care Plan - Patient's Chronic Conditions and Co-Morbidity Symptoms are Monitored and Maintained or Improved:   Monitor and assess patient's chronic conditions and comorbid symptoms for stability, deterioration, or improvement   Collaborate with multidisciplinary team to address chronic and comorbid conditions and prevent exacerbation or deterioration   Update acute care plan with appropriate goals if chronic or comorbid symptoms are exacerbated and prevent overall  improvement and discharge     Problem: Discharge Planning  Goal: Discharge to home or other facility with appropriate resources  5/1/2024 0220 by Yael Sánchez RN  Outcome: Progressing

## 2024-05-01 NOTE — PROGRESS NOTES
Encounter:   met with pt, offered emotional and spiritual support.  unable to assess. Consult/call  with need.  Madie Simmons Mdiv., [facility]     05/01/24 1035   Encounter Summary   Encounter Overview/Reason Initial Encounter   Service Provided For Patient   Support System Unknown   Last Encounter  05/01/24   Begin Time 1025   End Time  1030   Total Time Calculated 5 min   Assessment/Intervention/Outcome   Assessment Unable to assess

## 2024-05-01 NOTE — CARE COORDINATION
Shirley Romero at [facility] and the case is still pending review for acceptance at this time. CM will continue to follow patient until discharge.  Electronically signed by Monica Su RN on 5/1/2024 at 4:19 PM

## 2024-05-01 NOTE — PROGRESS NOTES
Comprehensive Nutrition Assessment    RECOMMENDATIONS:  PO Diet: Continue SBS per SLP, liberalize  ONS: Add Ensure Enlive BID  Nutrition Education: Education not indicated     NUTRITION ASSESSMENT:   Nutritional summary & status: LOS: Admitted s/p mechanical fall, ulna, radius fracture, also w/ third and fourth rib fractures. Noted pt w/ dementia requiring sitter, unable to provide nutrition hx. BMI in underweight category. Severe muscle/fat wasting noted. Significant 28% wt loss x 1 year PTA.Wt stable x 6 months. PO intake inadequate since admit, <50%. No family at bedside, per PCA, pt w/ issues swallowing. SLP on board following. Will order ONS to help meet energy/protein needs + liberalize diet. Given inadequate oral intake x 8 days, EN indicated. Pt cooperative/not pulling at lines/tubes per EMR. Will message MD to explore GOC.     Admission // PMH: sepsis // dementia    MALNUTRITION ASSESSMENT  Context of Malnutrition: Chronic Illness   Malnutrition Status: Severe malnutrition  Findings of the 6 clinical characteristics of malnutrition (Minimum of 2 out of 6 clinical characteristics is required to make the diagnosis of moderate or severe Protein Calorie Malnutrition based on AND/ASPEN Guidelines):  Energy Intake:  Mild decrease in energy intake (Comment)  Weight Loss:  Greater than 20% over 1 year (28%)     Body Fat Loss:  Severe body fat loss Buccal region, Triceps, Orbital   Muscle Mass Loss:  Severe muscle mass loss Temples (temporalis), Clavicles (pectoralis & deltoids), Hand (interosseous)      NUTRITION DIAGNOSIS   Unintended weight loss related to inadequate protein-energy intake as evidenced by weight loss greater than or equal to 20% in 1 year    Nutrition Monitoring and Evaluation:   Food/Nutrient Intake Outcomes:  Supplement Intake, Food and Nutrient Intake  Physical Signs/Symptoms Outcomes:  Biochemical Data, Nutrition Focused Physical Findings, Skin, Weight     OBJECTIVE DATA: Significant to

## 2024-05-01 NOTE — PROGRESS NOTES
V2.0    [facility]      Name:  Jennifer Lorenzana /Age/Sex: 1940  (84 y.o. female)   MRN & CSN:  [id number] & [id number] Encounter Date/Time: 2024 7:06 PM EDT   Location:  6313/6313-01 PCP: Michelle Jamil MD     Attending:Brigido Gonzalez MD       Hospital Day: 9    Assessment and Recommendations       Assessment/Plan:     Dementia with delirium:  Patient currently on Seroquel at hour sleep  Currently requiring sitter because patient is unsteady in her gait has been trying to get up around    Left radial/ulnar fracture status post reduction and splinting:  Conservative management per family wishes  Patient will follow-up with orthopedics in approximately 1 week 2024    Sepsis of unknown origin was ruled out    Type II NSTEMI    Disposition: Discussed with case management awaiting approval      Diet ADULT DIET; Dysphagia - Soft and Bite Sized; Low Fat/Low Chol/High Fiber/ALEXIA   DVT Prophylaxis [] Lovenox, [x]  Heparin, [] SCDs, [] Ambulation,  [] Eliquis, [] Xarelto  [] Coumadin   Code Status Full Code       Surrogate Decision Maker/ POA      Personally reviewed Lab Studies and Imaging             Subjective:     Chief Complaint: Increased agitation    84 y.o. female with PMHx significant for dementia who presented to ED with the complaint of fall. Patient has history of dementia. Lives at home by herself however it has been reported that the patient is being monitored by camera. Patient was found outside of her house in the yard laying on the ground. It appeared that the patient had suffered a fall. Patient was brought to the ED for further evaluation. Patient was found to have left distal ulna and radius fracture. Also she was found to have left third and fourth rib fractures. Patient underwent closed reduction of the left wrist fracture with application of the splint. Labs were remarkable for elevated lactate of 5.0 which trended down to 2.8. UA is still pending. Patient was given  words and phrases that may be inappropriate. If there are any questions or concerns please feel free to contact the dictating provider for clarification.

## 2024-05-01 NOTE — PROGRESS NOTES
Physical Therapy    Daily Treatment Note      Discharge Recommendations:  Subacute/Skilled Nursing Facility  Equipment Needs:  None    Assessment:  Pt more alert and cooperative today.  Pt continues to need 1-2 person assist for transfers and ambulation.  Standing balance is poor and pt is at high risk for falls.  Pt is not safe to mobilize alone.  Recommend continued skilled PT to increase strength and maximize mobility.    Chart Reviewed: Yes   Other position/activity restrictions: NWB LUE; up as tolerated   Additional Pertinent Hx: 84 y.o. female with PMHx significant for dementia who presented to ED with the complaint of fall, found down outside by neighbors. Patient was found to have left distal ulna and radius fracture.  Also she was found to have left third and fourth rib fractures.  Patient underwent closed reduction of the left wrist fracture with application of the splint, now NWB LUE. Also admitted for possible sepsis, UTI.      Diagnosis: sepsis   Treatment Diagnosis: dec functional mobility      Subjective:  Pt found supine in bed.  Pt more alert and cooperative with PT.  \"You're a nice lady.\"  Pt oriented to name only.     Pain:  Denies pain.       Objective:    Bed mobility  Supine to sit:  Mod A (HOB raised, increased verbal cues)    Transfers  Sit to stand:  Min A x2 (bed and chair heights to 2 hand held assist)  Stand to sit:  Mod A x2 (assist to fully turn before sitting down, unsteady)  Bed <> chair:  Min A x2 (stand step pivot with hand held assist)    Ambulation  Assistance Level:  Min A x2  Assistive device:  Hand Held assist  Distance:  20ft x2  Quality of gait:  B knee flexion, shuffled steps with decreased stride, narrow MARCO ANTONIO, unsteady gait  Other:  seated rest break      Exercises  LAQ 5 x2  Noah x5  Comment:  Increased verbal/tactile cues for quality of exercise    Neuro/balance  Sitting balance:  Good (supported sitting)  Fair (unsupported)  Static standing:  Poor (Min A with hand held

## 2024-05-01 NOTE — CARE COORDINATION
Spoke to son in person and updated him on progress of SNF referrals. Awaiting calls back from [facility]. Son states he is setting up destination after SNF stay with AL at [facility] on [address]. Electronically signed by Monica Su RN on 5/1/2024 at 12:38 PM

## 2024-05-01 NOTE — CARE COORDINATION
Called Nick at [facility] and she is reviewing case now. Called Adriana at [facility] and left message for him to call me back to let me know if he can or cannot take patient. Electronically signed by Monica Su RN on 5/1/2024 at 11:32 AM

## 2024-05-01 NOTE — PROGRESS NOTES
Occupational Therapy  Facility/Department: [address]  Occupational Therapy Treatment      Name: Jennifer Lorenzana  : 1940  MRN: [id number]  Date of Service: 2024     Discharge Recommendations:  Subacute/Skilled Nursing Facility        Patient Diagnosis(es): The primary encounter diagnosis was Fall, initial encounter. Diagnoses of Deformity of left wrist and Closed fracture of distal end of left forearm, initial encounter were also pertinent to this visit.  Past Medical History:  has a past medical history of Cancer (HCC), Diverticulitis, and Unspecified cerebral artery occlusion with cerebral infarction.  Past Surgical History:  has a past surgical history that includes Inguinal hernia repair (2011); joint replacement; skin biopsy; and hip surgery (Left, 2023).     Treatment Diagnosis: decreased independence 2/2 NWB status on L UE        Assessment   Performance deficits / Impairments: Decreased functional mobility ;Decreased endurance;Decreased posture;Decreased coordination;Decreased ADL status;Decreased strength  Assessment:  Pt is now below baseline 2/2 having 2 rib fxs, L UE wrist break with NWB status to L arm. Pt requiring Ax1-2 with fx mobility and transfers. Mobility bed to chair 2x, mobility to and from doorway. Seated rest break. UE/LE therex.  Assist with ADLs seated in chair. Limited by cognition. Will cont POC.  Treatment Diagnosis: decreased independence 2/2 NWB status on L UE  REQUIRES OT FOLLOW-UP: Yes  Activity Tolerance  Activity Tolerance: Treatment limited secondary to decreased cognition  Activity Tolerance Comments: .         Plan   Occupational Therapy Plan  Times Per Week: 2-5  Current Treatment Recommendations: Balance training, Self-Care / ADL, Pain management, Functional mobility training, Safety education & training, Endurance training, Cognitive reorientation, Strengthening, Gait training      Restrictions  Position Activity Restriction  Other  met  Short Term Goal 3: Pt will perform fx mobility to and from bathroom with SBA - Not met  Short Term Goal 4: Pt will perform UB dressing routine with min A - Not met  Patient Goals   Patient goals : \"to go home\"     Therapy Time    Individual Concurrent Group Co-treatment   Time In 1004      Time Out 1027      Minutes 23       Timed Code Treatment Minutes:23     Total Treatment Minutes:  23        Trixie Velazquez, OT

## 2024-05-02 VITALS
DIASTOLIC BLOOD PRESSURE: 68 MMHG | RESPIRATION RATE: 16 BRPM | SYSTOLIC BLOOD PRESSURE: 109 MMHG | BODY MASS INDEX: 16.97 KG/M2 | TEMPERATURE: 97.6 F | HEIGHT: 65 IN | WEIGHT: 101.85 LBS | HEART RATE: 84 BPM | OXYGEN SATURATION: 93 %

## 2024-05-02 PROCEDURE — 97530 THERAPEUTIC ACTIVITIES: CPT

## 2024-05-02 PROCEDURE — 2580000003 HC RX 258: Performed by: INTERNAL MEDICINE

## 2024-05-02 PROCEDURE — 97535 SELF CARE MNGMENT TRAINING: CPT

## 2024-05-02 PROCEDURE — 6360000002 HC RX W HCPCS: Performed by: INTERNAL MEDICINE

## 2024-05-02 PROCEDURE — 6370000000 HC RX 637 (ALT 250 FOR IP): Performed by: INTERNAL MEDICINE

## 2024-05-02 RX ADMIN — HEPARIN SODIUM 5000 UNITS: 5000 INJECTION INTRAVENOUS; SUBCUTANEOUS at 09:22

## 2024-05-02 RX ADMIN — ACETAMINOPHEN 650 MG: 325 TABLET ORAL at 10:40

## 2024-05-02 RX ADMIN — ATORVASTATIN CALCIUM 80 MG: 80 TABLET, FILM COATED ORAL at 09:24

## 2024-05-02 RX ADMIN — CITALOPRAM HYDROBROMIDE 10 MG: 10 TABLET ORAL at 09:20

## 2024-05-02 RX ADMIN — SODIUM CHLORIDE, PRESERVATIVE FREE 10 ML: 5 INJECTION INTRAVENOUS at 09:21

## 2024-05-02 ASSESSMENT — PAIN SCALES - PAIN ASSESSMENT IN ADVANCED DEMENTIA (PAINAD)
BODYLANGUAGE: TENSE, DISTRESSED PACING, FIDGETING
BREATHING: NORMAL
BODYLANGUAGE: RELAXED
CONSOLABILITY: NO NEED TO CONSOLE
BREATHING: NORMAL
FACIALEXPRESSION: SAD, FRIGHTENED, FROWN
BODYLANGUAGE: TENSE, DISTRESSED PACING, FIDGETING
CONSOLABILITY: NO NEED TO CONSOLE
TOTALSCORE: 2
FACIALEXPRESSION: SMILING OR INEXPRESSIVE
BREATHING: NORMAL
FACIALEXPRESSION: SMILING OR INEXPRESSIVE
TOTALSCORE: 0
FACIALEXPRESSION: SMILING OR INEXPRESSIVE
BREATHING: NORMAL
BODYLANGUAGE: RELAXED
TOTALSCORE: 0
BREATHING: NORMAL
BREATHING: NORMAL
FACIALEXPRESSION: SMILING OR INEXPRESSIVE
FACIALEXPRESSION: SMILING OR INEXPRESSIVE
BODYLANGUAGE: RELAXED
BREATHING: NORMAL
BODYLANGUAGE: RELAXED
TOTALSCORE: 0
BREATHING: NORMAL
BODYLANGUAGE: RELAXED
CONSOLABILITY: NO NEED TO CONSOLE
BODYLANGUAGE: RELAXED
BODYLANGUAGE: RELAXED
CONSOLABILITY: NO NEED TO CONSOLE
TOTALSCORE: 0
CONSOLABILITY: NO NEED TO CONSOLE
FACIALEXPRESSION: SAD, FRIGHTENED, FROWN
CONSOLABILITY: NO NEED TO CONSOLE
BREATHING: NORMAL
CONSOLABILITY: NO NEED TO CONSOLE
FACIALEXPRESSION: SMILING OR INEXPRESSIVE
TOTALSCORE: 0
TOTALSCORE: 0
TOTALSCORE: 2
BODYLANGUAGE: RELAXED
FACIALEXPRESSION: SMILING OR INEXPRESSIVE
BREATHING: NORMAL
CONSOLABILITY: NO NEED TO CONSOLE
TOTALSCORE: 0
TOTALSCORE: 0
FACIALEXPRESSION: SMILING OR INEXPRESSIVE

## 2024-05-02 ASSESSMENT — PAIN SCALES - WONG BAKER
WONGBAKER_NUMERICALRESPONSE: NO HURT

## 2024-05-02 ASSESSMENT — PAIN DESCRIPTION - DESCRIPTORS: DESCRIPTORS: ACHING

## 2024-05-02 ASSESSMENT — PAIN DESCRIPTION - ORIENTATION: ORIENTATION: LEFT

## 2024-05-02 ASSESSMENT — PAIN DESCRIPTION - LOCATION: LOCATION: ARM

## 2024-05-02 ASSESSMENT — PAIN - FUNCTIONAL ASSESSMENT: PAIN_FUNCTIONAL_ASSESSMENT: PREVENTS OR INTERFERES SOME ACTIVE ACTIVITIES AND ADLS

## 2024-05-02 NOTE — PROGRESS NOTES
Attempted to call report called to [facility] - sent to voicemail  Will call back later

## 2024-05-02 NOTE — PROGRESS NOTES
Occupational Therapy  Facility/Department: [address]  Occupational Therapy Treatment      Name: Jennifer Lorenzana  : 1940  MRN: [id number]  Date of Service: 2024     Discharge Recommendations:  Subacute/Skilled Nursing Facility        Patient Diagnosis(es): The primary encounter diagnosis was Fall, initial encounter. Diagnoses of Deformity of left wrist and Closed fracture of distal end of left forearm, initial encounter were also pertinent to this visit.  Past Medical History:  has a past medical history of Cancer (HCC), Diverticulitis, and Unspecified cerebral artery occlusion with cerebral infarction.  Past Surgical History:  has a past surgical history that includes Inguinal hernia repair (2011); joint replacement; skin biopsy; and hip surgery (Left, 2023).     Treatment Diagnosis: decreased independence 2/2 NWB status on L UE        Assessment   Performance deficits / Impairments: Decreased functional mobility ;Decreased endurance;Decreased posture;Decreased coordination;Decreased ADL status;Decreased strength  Assessment:  L UE wrist break with NWB status to L arm. Pt requiring Ax1 for bed to chair this date, Ax2 with bed mobility and cleanup. Limited by cognition. Will cont POC.  Treatment Diagnosis: decreased independence 2/2 NWB status on L UE  REQUIRES OT FOLLOW-UP: Yes  Activity Tolerance  Activity Tolerance: Treatment limited secondary to decreased cognition  Activity Tolerance Comments: .         Plan   Occupational Therapy Plan  Times Per Week: 2-5  Current Treatment Recommendations: Balance training, Self-Care / ADL, Pain management, Functional mobility training, Safety education & training, Endurance training, Cognitive reorientation, Strengthening, Gait training      Restrictions  Position Activity Restriction  Other position/activity restrictions: NWB LUE; up as tolerated     Subjective   General  Chart Reviewed: Yes  Patient assessed for rehabilitation services?:  Individual Concurrent Group Co-treatment   Time In 0930         Time Out 0953         Minutes 23          Timed Code Treatment Minutes:23     Total Treatment Minutes:  23        Trixie Velazquez, OT

## 2024-05-02 NOTE — CARE COORDINATION
pharmacy voucher for patients co-pays  5. Patients can then  the prescription on their way out of the hospital at discharge, or pharmacy can deliver to the bedside if staff is available. (payment due at time of pick-up or delivery - cash, check, or card accepted)     Able to afford home medications/ co-pay costs: Yes    ADLS:  Current PT AM-PAC Score: 11 /24  Current OT AM-PAC Score: 10 /24    DISCHARGE Disposition:    [facility]  Assisted living facility in [address]  Address: [facility] [address], [place] [address]  Phone: [phone]        [facility]  Services: Home Health Services   Address: [address], [facility] [address]   Phone: [phone]     LOC at discharge: Skilled  KATIE Completed: Yes    Notification completed in HENS/PAS?:  Not Applicable    IMM Completed:   No    IMM Letter given to Patient/Family/Significant other/Guardian/POA/by:: Del YEPEZ, RN,CM  IMM Letter date given:: 04/25/24  IMM Letter time given:: 1652  Notice of Medicare Non-Coverage Letter date given:: 04/30/24  Notice of Medicare Non-Coverage Time Given: 1500  Notice of Medicare Non-Coverage Letter Given By: Maddie Vicente RN CM    Transportation:  Transportation PLAN for discharge: family   Mode of Transport: Private Car  Reason for medical transport: Not Applicable  Name of Transport Company: Not Applicable  Time of Transport: 1630    Transport form completed: Not Indicated    Home Care:  Home Care ordered at discharge: Yes  Home Care Agency:       [facility]  Services: Home Health Services   Address: [address], [facility] [address]   Phone: [phone]   Orders faxed: Yes    Durable Medical Equipment:  DME Provider: defer  Equipment obtained during hospitalization: NA    Home Oxygen and Respiratory Equipment:  Oxygen needed at discharge?: No  Home Oxygen Company: Not Applicable  Portable tank available for discharge?: No    Dialysis:  Dialysis patient:

## 2024-05-02 NOTE — PROGRESS NOTES
Pt discharged to [facility] assisted [facility], transported by son. Report called to Eugenia at [facility]. All belongings collected and given to pt and son (Michael). Discharge teaching and paperwork discussed with and given to son. IV discontinued with no complications.

## 2024-05-02 NOTE — PROGRESS NOTES
V2.0    [facility] [place]      Name:  Jennifer Lorenzana /Age/Sex: 1940  (84 y.o. female)   MRN & CSN:  [id number] & [id number] Encounter Date/Time: 2024 7:06 PM EDT   Location:  63/6313-01 PCP: Michelle Jamil MD     Attending:Brigido Gonzalez MD       Hospital Day: 10    Assessment and Recommendations       Assessment/Plan:     Dementia with delirium:  Patient currently on Seroquel at hour sleep  Patient no longer requiring sitter    Left radial/ulnar fracture status post reduction and splinting:  Conservative management per family wishes  Patient will follow-up with orthopedics in approximately 1 week 2024    Poor p.o. intake:  Suspect secondary to advancing dementia  Will check baseline labs  Case discussed with dietitian    Status post fall with rib fractures and radial/ulnar fracture:  Patient stable at this time.  Without complaints of significant pain or discomfort    Sepsis of unknown origin was ruled out    Type II NSTEMI        Diet ADULT ORAL NUTRITION SUPPLEMENT; Lunch, Dinner; Standard High Calorie/High Protein Oral Supplement  ADULT DIET; Dysphagia - Soft and Bite Sized   DVT Prophylaxis [] Lovenox, [x]  Heparin, [] SCDs, [] Ambulation,  [] Eliquis, [] Xarelto  [] Coumadin   Code Status Full Code       Surrogate Decision Maker/ POA      Personally reviewed Lab Studies and Imaging             Subjective:     Chief Complaint: Increased agitation    84 y.o. female with PMHx significant for dementia who presented to ED with the complaint of fall. Patient has history of dementia. Lives at home by herself however it has been reported that the patient is being monitored by camera. Patient was found outside of her house in the yard laying on the ground. It appeared that the patient had suffered a fall. Patient was brought to the ED for further evaluation. Patient was found to have left distal ulna and radius fracture. Also she was found to have left third and fourth rib fractures.

## 2024-05-02 NOTE — PLAN OF CARE
Problem: Chronic Conditions and Co-morbidities  Goal: Patient's chronic conditions and co-morbidity symptoms are monitored and maintained or improved  5/1/2024 2157 by Felipe Bhakta, RN  Outcome: Progressing  Flowsheets (Taken 5/1/2024 2123)  Care Plan - Patient's Chronic Conditions and Co-Morbidity Symptoms are Monitored and Maintained or Improved: Collaborate with multidisciplinary team to address chronic and comorbid conditions and prevent exacerbation or deterioration     Problem: Pain  Goal: Verbalizes/displays adequate comfort level or baseline comfort level  5/1/2024 2157 by Felipe Bhakta, RN  Outcome: Progressing  Flowsheets (Taken 5/1/2024 2123)  Verbalizes/displays adequate comfort level or baseline comfort level: Assess pain using appropriate pain scale

## 2024-05-14 NOTE — PROGRESS NOTES
Physician Progress Note      PATIENT:               YORDAN MCCORMICK  CSN #:                  [id number]  :                       1940  ADMIT DATE:       2024 1:34 AM  DISCH DATE:        2024 5:12 PM  RESPONDING  PROVIDER #:        Brigido Gonzalez MD          QUERY TEXT:    Patient admitted  with left radial fx.  Noted to have met ASPEN criteria   for severe malnutrition per  Dietary assessment.  If possible, please   document in progress notes and discharge summary if you are evaluating and /or   treating any of the following:    The medical record reflects the following:  Risk Factors: per Dietary: pt w/ dementia requiring sitter.  BMI in   underweight category. Severe muscle/fat wasting noted. Significant 28% wt loss   x 1 year PTA.  Wt stable x 6 months. PO intake inadequate since admit, <50%.  Clinical Indicators: per Dietary: Energy Intake:  Mild decrease in energy   intake (Comment)  Weight Loss:  Greater than 20% over 1 year (28%)  Body Fat Loss:  Severe body fat loss Buccal region, Triceps, Orbital  Muscle Mass Loss:  Severe muscle mass loss Temples (temporalis), Clavicles   (pectoralis & deltoids), Hand (interosseous)  Treatment: Dietary consult, I&Os, wts    ASPEN Criteria:    https://aspenjournals.onlinelibrary.benítez.com/doi/full/10.1177/248955396789346  5  Options provided:  -- Protein calorie malnutrition severe  -- Other - I will add my own diagnosis  -- Disagree - Not applicable / Not valid  -- Disagree - Clinically unable to determine / Unknown  -- Refer to Clinical Documentation Reviewer    PROVIDER RESPONSE TEXT:    This patient has severe protein calorie malnutrition.    Query created by: Fede Escalante on 2024 7:52 PM      Electronically signed by:  Brigido Gonzalez MD 2024 2:46 PM

## 2024-10-21 ENCOUNTER — APPOINTMENT (OUTPATIENT)
Dept: CT IMAGING | Age: 84
End: 2024-10-21
Payer: MEDICARE

## 2024-10-21 ENCOUNTER — APPOINTMENT (OUTPATIENT)
Dept: GENERAL RADIOLOGY | Age: 84
End: 2024-10-21
Payer: MEDICARE

## 2024-10-21 ENCOUNTER — HOSPITAL ENCOUNTER (EMERGENCY)
Age: 84
Discharge: HOME | End: 2024-10-21
Attending: EMERGENCY MEDICINE
Payer: MEDICARE

## 2024-10-21 VITALS
TEMPERATURE: 98.6 F | DIASTOLIC BLOOD PRESSURE: 73 MMHG | HEART RATE: 78 BPM | WEIGHT: 92.7 LBS | RESPIRATION RATE: 18 BRPM | OXYGEN SATURATION: 97 % | SYSTOLIC BLOOD PRESSURE: 127 MMHG
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
## 2024-10-21 LAB
ANION GAP SERPL CALCULATED.3IONS-SCNC: 11 MMOL/L (ref 3–16)
BACTERIA URNS QL MICRO: ABNORMAL /HPF
BASOPHILS # BLD: 0.1 K/UL (ref 0–0.2)
BASOPHILS NFR BLD: 0.6 %
BILIRUB UR QL STRIP.AUTO: ABNORMAL
BUN SERPL-MCNC: 16 MG/DL (ref 7–20)
CALCIUM SERPL-MCNC: 9.2 MG/DL (ref 8.3–10.6)
CHLORIDE SERPL-SCNC: 103 MMOL/L (ref 99–110)
CK SERPL-CCNC: 194 U/L (ref 26–192)
CLARITY UR: CLEAR
CO2 SERPL-SCNC: 29 MMOL/L (ref 21–32)
COLOR UR: YELLOW
CREAT SERPL-MCNC: 0.8 MG/DL (ref 0.6–1.2)
DEPRECATED RDW RBC AUTO: 15.9 % (ref 12.4–15.4)
EKG DIAGNOSIS: NORMAL
EKG Q-T INTERVAL: 410 MS
EKG QRS DURATION: 130 MS
EKG QTC CALCULATION (BAZETT): 496 MS
EKG R AXIS: -25 DEGREES
EKG T AXIS: -29 DEGREES
EKG VENTRICULAR RATE: 88 BPM
EOSINOPHIL # BLD: 0 K/UL (ref 0–0.6)
EOSINOPHIL NFR BLD: 0.1 %
GFR SERPLBLD CREATININE-BSD FMLA CKD-EPI: 71 ML/MIN/{1.73_M2}
GLUCOSE SERPL-MCNC: 126 MG/DL (ref 70–99)
GLUCOSE UR STRIP.AUTO-MCNC: NEGATIVE MG/DL
HCT VFR BLD AUTO: 41.2 % (ref 36–48)
HGB BLD-MCNC: 13.6 G/DL (ref 12–16)
HGB UR QL STRIP.AUTO: NEGATIVE
KETONES UR STRIP.AUTO-MCNC: ABNORMAL MG/DL
LEUKOCYTE ESTERASE UR QL STRIP.AUTO: NEGATIVE
LYMPHOCYTES # BLD: 1.6 K/UL (ref 1–5.1)
LYMPHOCYTES NFR BLD: 11.5 %
MCH RBC QN AUTO: 30.8 PG (ref 26–34)
MCHC RBC AUTO-ENTMCNC: 33 G/DL (ref 31–36)
MCV RBC AUTO: 93.3 FL (ref 80–100)
MONOCYTES # BLD: 1.3 K/UL (ref 0–1.3)
MONOCYTES NFR BLD: 9 %
NEUTROPHILS # BLD: 11.2 K/UL (ref 1.7–7.7)
NEUTROPHILS NFR BLD: 78.8 %
NITRITE UR QL STRIP.AUTO: NEGATIVE
PATH INTERP BLD-IMP: NO
PH UR STRIP.AUTO: 6 [PH] (ref 5–8)
PLATELET # BLD AUTO: ABNORMAL K/UL (ref 135–450)
PLATELET BLD QL SMEAR: ABNORMAL
PMV BLD AUTO: ABNORMAL FL (ref 5–10.5)
POTASSIUM SERPL-SCNC: 4.7 MMOL/L (ref 3.5–5.1)
PROT UR STRIP.AUTO-MCNC: 30 MG/DL
RBC # BLD AUTO: 4.42 M/UL (ref 4–5.2)
RBC #/AREA URNS HPF: ABNORMAL /HPF (ref 0–4)
SLIDE REVIEW: ABNORMAL
SODIUM SERPL-SCNC: 143 MMOL/L (ref 136–145)
SP GR UR STRIP.AUTO: >=1.03 (ref 1–1.03)
TROPONIN, HIGH SENSITIVITY: 35 NG/L (ref 0–14)
TROPONIN, HIGH SENSITIVITY: 37 NG/L (ref 0–14)
UA DIPSTICK W REFLEX MICRO PNL UR: YES
URN SPEC COLLECT METH UR: ABNORMAL
UROBILINOGEN UR STRIP-ACNC: 0.2 E.U./DL
WBC # BLD AUTO: 14.2 K/UL (ref 4–11)
WBC #/AREA URNS HPF: ABNORMAL /HPF (ref 0–5)

## 2024-10-21 PROCEDURE — 71045 X-RAY EXAM CHEST 1 VIEW: CPT

## 2024-10-21 PROCEDURE — 70450 CT HEAD/BRAIN W/O DYE: CPT

## 2024-10-21 PROCEDURE — 99285 EMERGENCY DEPT VISIT HI MDM: CPT

## 2024-10-21 PROCEDURE — 72125 CT NECK SPINE W/O DYE: CPT

## 2024-10-21 PROCEDURE — 82550 ASSAY OF CK (CPK): CPT

## 2024-10-21 PROCEDURE — 51701 INSERT BLADDER CATHETER: CPT

## 2024-10-21 PROCEDURE — 80048 BASIC METABOLIC PNL TOTAL CA: CPT

## 2024-10-21 PROCEDURE — 84484 ASSAY OF TROPONIN QUANT: CPT

## 2024-10-21 PROCEDURE — 70486 CT MAXILLOFACIAL W/O DYE: CPT

## 2024-10-21 PROCEDURE — 36415 COLL VENOUS BLD VENIPUNCTURE: CPT

## 2024-10-21 PROCEDURE — 73130 X-RAY EXAM OF HAND: CPT

## 2024-10-21 PROCEDURE — 81001 URINALYSIS AUTO W/SCOPE: CPT

## 2024-10-21 PROCEDURE — 85025 COMPLETE CBC W/AUTO DIFF WBC: CPT

## 2024-10-21 PROCEDURE — 73522 X-RAY EXAM HIPS BI 3-4 VIEWS: CPT

## 2024-10-21 PROCEDURE — 93005 ELECTROCARDIOGRAM TRACING: CPT | Performed by: EMERGENCY MEDICINE

## 2024-10-21 NOTE — ED PROVIDER NOTES
THE [facility]  EMERGENCY DEPARTMENT ENCOUNTER          ATTENDING PHYSICIAN NOTE       Date of evaluation: 10/21/2024    Chief Complaint     Fall (Fell at [facility] memory unit, PCA did mornign rounds and found her on the floor, no info given)      History of Present Illness     Jennifer Lorenzana is a 84 y.o. female who presents for evaluation after being found down at her care unit.  Patient is in a memory unit and was noted by staff to be on the floor.  It is unclear as to how long the patient has been down.  The staff member did not have full identification for the patient for EMS and attempts to contact the facility were unanswered so the patient has no known name or medical history.  She does appear to have severe dementia and is unable to provide any history.    ASSESSMENT / PLAN  (MEDICAL DECISION MAKING)     INITIAL VITALS: BP: (!) 145/74,  , Pulse: 70, Respirations: 15, SpO2: 94 %      Jennifer Lorenzana is a 84 y.o. female with likely severe dementia presents after being found down at her care facility.  Patient is unable to provide history.  She does have swelling to the right side of her face with a wound present as well as a skin tear to the right hand, both with dried blood.  She has no long bone tenderness on exam and no spine tenderness on exam.  Patient was written for laboratory studies and imaging due to it being unclear of the circumstances of her fall.  At this time, care of the patient will be turned over to the oncoming provider who complete the patient's workup and disposition pending results of testing as well as attempts to fully identify the patient.    Is this patient to be included in the SEP-1 core measure? No Exclusion criteria - the patient is NOT to be included for SEP-1 Core Measure due to: Infection is not suspected    Medical Decision Making    Clinical Impression     No diagnosis found.    Disposition     PATIENT REFERRED TO:  No follow-up provider specified.    DISCHARGE

## 2024-10-21 NOTE — ED NOTES
Called Rom [phone] the directed of nursing who gave little to no information on the patient.      Renita Stallworth, RN  10/21/24 2475

## 2024-10-21 NOTE — ED NOTES
Attempted to call report to [facility] with no answer.     Gary Keller, RN  10/21/24 4303

## 2024-10-21 NOTE — ED PROVIDER NOTES
THE [facility]  EMERGENCY DEPARTMENT ENCOUNTER          ATTENDING PHYSICIAN NOTE       Date of evaluation: 10/21/2024    ADDENDUM:      Care of this patient was assumed from Dr. Irvin.  The patient was seen for Fall (Fell at [facility] unit, PCA did mornign rounds and found her on the floor, no info given)  .  The patient's initial evaluation and plan have been discussed with the prior provider who initially evaluated the patient.  Nursing Notes, Past Medical Hx, Past Surgical Hx, Social Hx, Allergies, and Family Hx were all reviewed.    ASSESSMENT / PLAN  (MEDICAL DECISION MAKING)     Jennifer Lorenzana is a 84 y.o. female who was reportedly brought to the emergency department by EMS from a local [facility] facility, although as an unidentified patient at that time, as the sending facility staff had no information on the patient.  She was seen to have some evidence of injuries from a fall.  Please see Dr. Irvin's initial dictation for details of the patient's history, physical examination, and initial emergency department course.  The patient's care was given over to me at 0700 hrs., with results of imaging relating to potential injuries pending, as well as a basic medical workup, given the very unclear circumstances of her having been found down.    Laboratory evaluation is generally unremarkable, with troponins mildly elevated in the 30s, but stable.  Normal electrolytes and renal function, mildly elevated white blood cell count, but overall unremarkable blood counts.  Faintly elevated total CK.  CT of the head, C-spine, face show no acute traumatic abnormalities.  Plain films of the chest, right hand, pelvis and hips show no acute traumatic abnormalities.     Patient had several areas of skin tear/abrasion, which were cleansed and bandaged.  The patient was thereafter considered stable to return to her [facility] care facility.    Is this patient to be included in the SEP-1 core measure? No

## 2024-10-21 NOTE — DISCHARGE INSTRUCTIONS
Please keep the various small skin tears and abrasions clean and dry, change the dressing daily, until they begin to heal.  Please take all medications as prescribed.  Please return to the emergency department for worsening symptoms or other concerns.

## (undated) DEVICE — ADAPT CLIP: Brand: GAMMA

## (undated) DEVICE — PROTECTOR ULN NRV PUR FOAM HK LOOP STRP ANATOMICALLY

## (undated) DEVICE — 3M™ TEGADERM™ TRANSPARENT FILM DRESSING FRAME STYLE, 1626W, 4 IN X 4-3/4 IN (10 CM X 12 CM), 50/CT 4CT/CASE: Brand: 3M™ TEGADERM™

## (undated) DEVICE — LAG SCREW STEP DRILL: Brand: GAMMA

## (undated) DEVICE — PADDING UNDERCAST W4INXL4YD 100% COT CRIMPED FINISH WBRL II

## (undated) DEVICE — DRILL, AO SMALL: Brand: GAMMA

## (undated) DEVICE — SUTURE VCRL SZ 2-0 L18IN ABSRB UD CT-1 L36MM 1/2 CIR J839D

## (undated) DEVICE — BLADE,CARBON-STEEL,10,STRL,DISPOSABLE,TB: Brand: MEDLINE

## (undated) DEVICE — 6619 2 PTNT ISO SYS INCISE AREA&LT;(&GT;&&LT;)&GT;P: Brand: STERI-DRAPE™ IOBAN™ 2

## (undated) DEVICE — TOWEL,STOP FLAG GOLD N-W: Brand: MEDLINE

## (undated) DEVICE — HIP PINNING: Brand: MEDLINE INDUSTRIES, INC.

## (undated) DEVICE — GLOVE ORTHO 8   MSG9480

## (undated) DEVICE — SOLUTION IV 1000ML 0.9% SOD CHL

## (undated) DEVICE — ONE STEP CONICAL REAMER: Brand: GAMMA

## (undated) DEVICE — SPONGE GZ W4XL8IN COT WVN 12 PLY

## (undated) DEVICE — SUTURE VCRL SZ 3-0 L18IN ABSRB UD L26MM SH 1/2 CIR J864D

## (undated) DEVICE — GUIDE WIRE, BALL-TIPPED, STERILE

## (undated) DEVICE — BANDAGE COMPR W6INXL4.5YD LTWT E EC SGL LAYERED CLP CLSR

## (undated) DEVICE — DRESSING FOAM SELF ADH 10X10 CM ABSORBENT MEPILEX BORDER FLX